# Patient Record
Sex: FEMALE | Race: WHITE | NOT HISPANIC OR LATINO | Employment: OTHER | URBAN - NONMETROPOLITAN AREA
[De-identification: names, ages, dates, MRNs, and addresses within clinical notes are randomized per-mention and may not be internally consistent; named-entity substitution may affect disease eponyms.]

---

## 2017-04-12 ENCOUNTER — HOSPITAL ENCOUNTER (EMERGENCY)
Facility: HOSPITAL | Age: 57
Discharge: HOME | End: 2017-04-12
Attending: EMERGENCY MEDICINE | Admitting: EMERGENCY MEDICINE

## 2017-04-12 VITALS
RESPIRATION RATE: 18 BRPM | TEMPERATURE: 97.9 F | BODY MASS INDEX: 30.53 KG/M2 | SYSTOLIC BLOOD PRESSURE: 128 MMHG | DIASTOLIC BLOOD PRESSURE: 76 MMHG | WEIGHT: 190 LBS | HEIGHT: 66 IN | OXYGEN SATURATION: 95 % | HEART RATE: 81 BPM

## 2017-04-12 DIAGNOSIS — B86 SCABIES: Primary | ICD-10-CM

## 2017-04-12 PROCEDURE — 99282 EMERGENCY DEPT VISIT SF MDM: CPT

## 2017-04-12 RX ORDER — PANTOPRAZOLE SODIUM 20 MG/1
20 TABLET, DELAYED RELEASE ORAL DAILY
COMMUNITY

## 2017-04-12 RX ORDER — ASPIRIN 81 MG/1
81 TABLET, CHEWABLE ORAL DAILY
COMMUNITY

## 2017-04-12 RX ORDER — LOSARTAN POTASSIUM 25 MG/1
50 TABLET ORAL 2 TIMES DAILY
COMMUNITY

## 2017-04-12 RX ORDER — PERMETHRIN 50 MG/G
CREAM TOPICAL TAKE AS DIRECTED
Qty: 60 G | Refills: 0 | Status: SHIPPED | OUTPATIENT
Start: 2017-04-12 | End: 2018-04-11 | Stop reason: HOSPADM

## 2017-04-12 RX ORDER — HYDROXYZINE HYDROCHLORIDE 25 MG/1
25 TABLET, FILM COATED ORAL EVERY 8 HOURS PRN
Qty: 14 TABLET | Refills: 0 | Status: SHIPPED | OUTPATIENT
Start: 2017-04-12 | End: 2018-09-04

## 2017-04-12 NOTE — ED PROVIDER NOTES
Subjective   History of Present Illness  TRIAGE CHIEF COMPLAINT:   Chief Complaint   Patient presents with   • Rash         HPI: Katelynn Ribeiro   is a 57 y.o. female   who presents to the emergency department complaining of itchy rash.  Patient describes rash predominantly in focal spots that look like bug bites on her bilateral upper extremities and to a lesser extent her lower extremities.  It is quite itchy and there are several areas which appear like lines of these little bumps on her forearms.  First noticed these symptoms a few days ago and it has been getting worse.  No other complaints.           Review of Systems   All other systems reviewed and are negative.      Past Medical History:   Diagnosis Date   • Esophagitis    • History of diverticulosis    • History of tattoo    • Hypertension        Allergies   Allergen Reactions   • Sulfa Antibiotics        Past Surgical History:   Procedure Laterality Date   • COLONOSCOPY     • UPPER GASTROINTESTINAL ENDOSCOPY         Family History   Problem Relation Age of Onset   • Cirrhosis Other        Social History     Social History   • Marital status: Single     Spouse name: N/A   • Number of children: N/A   • Years of education: N/A     Social History Main Topics   • Smoking status: Light Tobacco Smoker   • Smokeless tobacco: None   • Alcohol use Yes      Comment: stopped in Nov 2016   • Drug use: No   • Sexual activity: Not Asked     Other Topics Concern   • None     Social History Narrative   • None           Objective   Physical Exam    CONSTITUTIONAL: Awake, oriented, appears non-toxic   HENT: Atraumatic, normocephalic, oral mucosa pink and moist, airway patent. Nares patent without drainage. External ears normal.   EYES: Conjunctiva clear, EOMI, PERRL   NECK: Trachea midline, non-tender, supple   CARDIOVASCULAR: Normal heart rate, Normal rhythm, No murmurs, rubs, gallops   PULMONARY/CHEST: Clear to auscultation, no rhonchi, wheezes, or rales.  Symmetrical breath sounds. Non-tender.   ABDOMINAL: Non-distended, soft, non-tender - no rebound or guarding. BS normal.   NEUROLOGIC: Non-focal, moving all four extremities, no gross sensory or motor deficits.   EXTREMITIES: No clubbing, cyanosis, or edema   SKIN: Pruritic rash consistent with scabies infection.  No cellulitis.    No orders to display           EKG:         Procedures         ED Course  ED Course          ED COURSE / MEDICAL DECISION MAKING:       DECISION TO DISCHARGE/ADMIT: see ED care timeline       Electronically signed by: Francis Santiago MD, 4/12/2017 7:57 AM              [facility]    Final diagnoses:   Scabies            Francis Santiago MD  04/12/17 0759

## 2017-12-04 ENCOUNTER — TRANSCRIBE ORDERS (OUTPATIENT)
Dept: ADMINISTRATIVE | Facility: HOSPITAL | Age: 57
End: 2017-12-04

## 2017-12-04 DIAGNOSIS — Z12.39 ENCOUNTER FOR BREAST CANCER SCREENING OTHER THAN MAMMOGRAM: Primary | ICD-10-CM

## 2017-12-04 DIAGNOSIS — Z87.891 PERSONAL HISTORY OF TOBACCO USE, PRESENTING HAZARDS TO HEALTH: ICD-10-CM

## 2018-03-22 ENCOUNTER — OFFICE VISIT (OUTPATIENT)
Dept: SURGERY | Facility: CLINIC | Age: 58
End: 2018-03-22

## 2018-03-22 VITALS
BODY MASS INDEX: 27.48 KG/M2 | WEIGHT: 171 LBS | SYSTOLIC BLOOD PRESSURE: 122 MMHG | HEART RATE: 111 BPM | DIASTOLIC BLOOD PRESSURE: 70 MMHG | TEMPERATURE: 99.2 F | HEIGHT: 66 IN | OXYGEN SATURATION: 96 %

## 2018-03-22 DIAGNOSIS — Z12.11 ENCOUNTER FOR COLONOSCOPY DUE TO HISTORY OF ADENOMATOUS COLONIC POLYPS: Primary | ICD-10-CM

## 2018-03-22 DIAGNOSIS — Z86.010 ENCOUNTER FOR COLONOSCOPY DUE TO HISTORY OF ADENOMATOUS COLONIC POLYPS: Primary | ICD-10-CM

## 2018-03-22 DIAGNOSIS — R19.5 HEME POSITIVE STOOL: ICD-10-CM

## 2018-03-22 PROBLEM — Z86.0101 ENCOUNTER FOR COLONOSCOPY DUE TO HISTORY OF ADENOMATOUS COLONIC POLYPS: Status: ACTIVE | Noted: 2018-03-22

## 2018-03-22 PROCEDURE — 99203 OFFICE O/P NEW LOW 30 MIN: CPT | Performed by: SURGERY

## 2018-03-22 RX ORDER — METOPROLOL SUCCINATE 50 MG/1
50 TABLET, EXTENDED RELEASE ORAL DAILY
Refills: 11 | COMMUNITY
Start: 2018-03-14

## 2018-03-22 RX ORDER — TRAZODONE HYDROCHLORIDE 100 MG/1
150 TABLET ORAL NIGHTLY
COMMUNITY
Start: 2014-04-18

## 2018-03-22 RX ORDER — FLUTICASONE PROPIONATE 50 MCG
2 SPRAY, SUSPENSION (ML) NASAL DAILY PRN
COMMUNITY
Start: 2014-11-04

## 2018-03-22 NOTE — PROGRESS NOTES
Patient: Katelynn Ribeiro    YOB: 1960    Date: 03/22/2018    Primary Care Provider: THEODORE Robles    Chief Complaint   Patient presents with   • Rectal Bleeding     Patient is here for a postive occult blood test       Subjective .     History of present illness:  I saw the patient in the office today as a consultation for evaluation and treatment of blood in stool.  Patient states that she has not noticed any blood in her stool.  Patient states that she will go three days before she will have a bowel movement.  Patient states that is her normal bowel routine.  Patient states that she will get gas pains every once in a while.  Patient had a colonoscopy done 4  years ago. Patient denies any family history of colon cancer.    The following portions of the patient's history were reviewed and updated as appropriate: allergies, current medications, past family history, past medical history, past social history, past surgical history and problem list.      Review of Systems   Constitutional: Negative for chills, fever and unexpected weight change.   HENT: Negative for hearing loss, trouble swallowing and voice change.    Eyes: Negative for visual disturbance.   Respiratory: Positive for shortness of breath. Negative for apnea, cough, chest tightness and wheezing.    Cardiovascular: Negative for chest pain, palpitations and leg swelling.   Gastrointestinal: Positive for blood in stool. Negative for abdominal distention, abdominal pain, anal bleeding, constipation, diarrhea, nausea, rectal pain and vomiting.   Endocrine: Negative for cold intolerance and heat intolerance.   Genitourinary: Negative for difficulty urinating, dysuria and flank pain.   Musculoskeletal: Negative for back pain and gait problem.   Skin: Negative for color change, rash and wound.   Neurological: Negative for dizziness, syncope, speech difficulty, weakness, light-headedness, numbness and headaches.   Hematological:  Negative for adenopathy. Does not bruise/bleed easily.   Psychiatric/Behavioral: Negative for confusion. The patient is not nervous/anxious.        History:  Past Medical History:   Diagnosis Date   • Esophagitis    • History of diverticulosis    • History of tattoo    • Hypertension        Past Surgical History:   Procedure Laterality Date   • COLONOSCOPY     • UPPER GASTROINTESTINAL ENDOSCOPY         Family History   Problem Relation Age of Onset   • Cirrhosis Other        Social History   Substance Use Topics   • Smoking status: Light Tobacco Smoker   • Smokeless tobacco: Not on file   • Alcohol use Yes      Comment: stopped in Nov 2016       Allergies:  Allergies   Allergen Reactions   • Sulfa Antibiotics        Medications:    Current Outpatient Prescriptions:   •  aspirin 81 MG chewable tablet, Chew 81 mg Daily., Disp: , Rfl:   •  fluticasone (FLONASE) 50 MCG/ACT nasal spray, into each nostril., Disp: , Rfl:   •  hydrOXYzine (ATARAX) 25 MG tablet, Take 1 tablet by mouth Every 8 (Eight) Hours As Needed for Itching., Disp: 14 tablet, Rfl: 0  •  INCRUSE ELLIPTA 62.5 MCG/INH aerosol powder , INHALE 1 PUFF BY INHALATION ROUTE EVERY DAY AT THE SAME TIME EACH DAY, Disp: , Rfl: 2  •  losartan (COZAAR) 25 MG tablet, Take 25 mg by mouth 2 (Two) Times a Day., Disp: , Rfl:   •  metoprolol succinate XL (TOPROL-XL) 50 MG 24 hr tablet, Take 50 mg by mouth Daily., Disp: , Rfl: 11  •  Multiple Vitamin (MULTI-VITAMIN DAILY PO), Take  by mouth., Disp: , Rfl:   •  pantoprazole (PROTONIX) 20 MG EC tablet, Take 20 mg by mouth Daily., Disp: , Rfl:   •  permethrin (ACTICIN) 5 % cream, Apply  topically Take As Directed for 2 doses. Apply cream to entire body, wash off after 8-14 hours then repeat application in one week, Disp: 60 g, Rfl: 0  •  sertraline (ZOLOFT) 50 MG tablet, Take 50 mg by mouth Daily., Disp: , Rfl:   •  traZODone (DESYREL) 100 MG tablet, Take  by mouth., Disp: , Rfl:     Objective     Vital Signs:   Temp:  [99.2 °F  (37.3 °C)] 99.2 °F (37.3 °C)  Heart Rate:  [111] 111  BP: (122)/(70) 122/70    Physical Exam:   General Appearance:    Alert, cooperative, in no acute distress   Head:    Normocephalic, without obvious abnormality, atraumatic   Eyes:            Lids and lashes normal, conjunctivae and sclerae normal, no   icterus, no pallor, corneas clear, PERRL   Ears:    Ears appear intact with no abnormalities noted   Lungs:     Clear to auscultation,respirations regular, even and                  unlabored    Heart:    Regular rhythm and normal rate, normal S1 and S2, no            murmur   Abdomen:     no masses, no organomegaly, soft non-tender, non-distended, no guarding, there is no evidence of tenderness   Extremities:   Moves all extremities well, no edema, no cyanosis, no             redness   Skin:   No bleeding, bruising or rash   Neurologic:   Cranial nerves 2 - 12 grossly intact, sensation intact      Results Review:   I reviewed the patient's new clinical results.  Labs were reviewed    Review of Systems was reviewed and confirmed as accurate today.    Assessment/Plan :    1. Encounter for colonoscopy due to history of adenomatous colonic polyps    2. Heme positive stool        I recommend a colonoscopy for further evaluation. The procedure was explained as well as the risks which include but are not limited to bleeding, infection, perforation, abdominal pain etc. The patient understands these risks and the procedure and wishes to proceed.     Electronically signed by Tan Nolan MD  03/22/18 2:23 PM  Scribed for Tan Nolan MD by Yanni Gorman. 3/22/2018  3:00 PM

## 2018-03-23 PROBLEM — R19.5 HEME POSITIVE STOOL: Status: ACTIVE | Noted: 2018-03-23

## 2018-04-10 RX ORDER — POLYETHYLENE GLYCOL 3350 17 G/17G
238 POWDER, FOR SOLUTION ORAL ONCE
Qty: 14 PACKET | Refills: 0 | Status: SHIPPED | OUTPATIENT
Start: 2018-04-10 | End: 2018-04-11 | Stop reason: HOSPADM

## 2018-04-10 RX ORDER — BISACODYL 5 MG/1
5 TABLET, DELAYED RELEASE ORAL DAILY PRN
Qty: 4 TABLET | Refills: 0 | Status: SHIPPED | OUTPATIENT
Start: 2018-04-10

## 2018-04-10 RX ORDER — POLYETHYLENE GLYCOL 3350 17 G/17G
238 POWDER, FOR SOLUTION ORAL ONCE
Qty: 14 PACKET | Refills: 0 | Status: SHIPPED | OUTPATIENT
Start: 2018-04-10 | End: 2018-04-10

## 2018-04-10 RX ORDER — BISACODYL 5 MG/1
5 TABLET, DELAYED RELEASE ORAL DAILY PRN
Qty: 4 TABLET | Refills: 0 | Status: SHIPPED | OUTPATIENT
Start: 2018-04-10 | End: 2018-04-10

## 2018-04-11 ENCOUNTER — HOSPITAL ENCOUNTER (OUTPATIENT)
Facility: HOSPITAL | Age: 58
Setting detail: HOSPITAL OUTPATIENT SURGERY
Discharge: HOME | End: 2018-04-11
Attending: SURGERY | Admitting: SURGERY

## 2018-04-11 ENCOUNTER — ANESTHESIA (OUTPATIENT)
Dept: GASTROENTEROLOGY | Facility: HOSPITAL | Age: 58
End: 2018-04-11

## 2018-04-11 ENCOUNTER — ANESTHESIA EVENT (OUTPATIENT)
Dept: GASTROENTEROLOGY | Facility: HOSPITAL | Age: 58
End: 2018-04-11

## 2018-04-11 VITALS
TEMPERATURE: 98.1 F | DIASTOLIC BLOOD PRESSURE: 71 MMHG | HEART RATE: 72 BPM | WEIGHT: 171 LBS | OXYGEN SATURATION: 96 % | HEIGHT: 66 IN | RESPIRATION RATE: 18 BRPM | SYSTOLIC BLOOD PRESSURE: 146 MMHG | BODY MASS INDEX: 27.48 KG/M2

## 2018-04-11 DIAGNOSIS — R19.5 HEME POSITIVE STOOL: ICD-10-CM

## 2018-04-11 DIAGNOSIS — Z86.010 ENCOUNTER FOR COLONOSCOPY DUE TO HISTORY OF ADENOMATOUS COLONIC POLYPS: ICD-10-CM

## 2018-04-11 DIAGNOSIS — Z12.11 ENCOUNTER FOR COLONOSCOPY DUE TO HISTORY OF ADENOMATOUS COLONIC POLYPS: ICD-10-CM

## 2018-04-11 PROCEDURE — 25010000002 PROPOFOL 10 MG/ML EMULSION: Performed by: NURSE ANESTHETIST, CERTIFIED REGISTERED

## 2018-04-11 RX ORDER — ONDANSETRON 2 MG/ML
4 INJECTION INTRAMUSCULAR; INTRAVENOUS ONCE AS NEEDED
Status: DISCONTINUED | OUTPATIENT
Start: 2018-04-11 | End: 2018-04-11 | Stop reason: HOSPADM

## 2018-04-11 RX ORDER — PROPOFOL 10 MG/ML
VIAL (ML) INTRAVENOUS AS NEEDED
Status: DISCONTINUED | OUTPATIENT
Start: 2018-04-11 | End: 2018-04-11 | Stop reason: SURG

## 2018-04-11 RX ORDER — LIDOCAINE HYDROCHLORIDE 20 MG/ML
INJECTION, SOLUTION INFILTRATION; PERINEURAL AS NEEDED
Status: DISCONTINUED | OUTPATIENT
Start: 2018-04-11 | End: 2018-04-11 | Stop reason: SURG

## 2018-04-11 RX ORDER — ONDANSETRON 2 MG/ML
4 INJECTION INTRAMUSCULAR; INTRAVENOUS ONCE AS NEEDED
Status: CANCELLED | OUTPATIENT
Start: 2018-04-11 | End: 2018-04-11

## 2018-04-11 RX ORDER — SODIUM CHLORIDE, SODIUM LACTATE, POTASSIUM CHLORIDE, CALCIUM CHLORIDE 600; 310; 30; 20 MG/100ML; MG/100ML; MG/100ML; MG/100ML
INJECTION, SOLUTION INTRAVENOUS CONTINUOUS PRN
Status: DISCONTINUED | OUTPATIENT
Start: 2018-04-11 | End: 2018-04-11 | Stop reason: SURG

## 2018-04-11 RX ADMIN — LIDOCAINE HYDROCHLORIDE 100 MG: 20 INJECTION, SOLUTION INFILTRATION; PERINEURAL at 08:15

## 2018-04-11 RX ADMIN — PROPOFOL 600 MG: 10 INJECTION, EMULSION INTRAVENOUS at 08:40

## 2018-04-11 RX ADMIN — SODIUM CHLORIDE, POTASSIUM CHLORIDE, SODIUM LACTATE AND CALCIUM CHLORIDE: 600; 310; 30; 20 INJECTION, SOLUTION INTRAVENOUS at 08:08

## 2018-04-11 NOTE — ANESTHESIA PREPROCEDURE EVALUATION
Anesthesia Evaluation     Patient summary reviewed and Nursing notes reviewed   no history of anesthetic complications:  NPO Solid Status: > 8 hours  NPO Liquid Status: > 8 hours           Airway   Mallampati: II  TM distance: >3 FB  Neck ROM: full  no difficulty expected  Dental - normal exam     Pulmonary - normal exam   (+) COPD moderate,   Cardiovascular - normal exam    Rhythm: regular  Rate: normal    (+) hypertension well controlled,       Neuro/Psych  (+) psychiatric history Anxiety and Depression,     GI/Hepatic/Renal/Endo    (+)  GERD well controlled,      Musculoskeletal     Abdominal    Substance History - negative use     OB/GYN negative ob/gyn ROS         Other   (+) arthritis     ROS/Med Hx Other: Wears O2 at hs  Uses MDIs daily                Anesthesia Plan    ASA 2     MAC   (Pt told that intravenous sedation will be used as the primary anesthetic along with local anesthesia if necessary. Every effort will be made to make sure the patient is comfortable.     The patient was told they may or may not have recall for the procedure. It was further explained that if the MAC was not adequate that a general anesthetic with either an LMA or endotracheal tube would be required.     Will proceed with the plan of care.)  intravenous induction   Anesthetic plan and risks discussed with patient.

## 2018-04-11 NOTE — ANESTHESIA POSTPROCEDURE EVALUATION
Patient: Katelynn Ribeiro    Procedure Summary     Date:  04/11/18 Room / Location:  [facility] ENDOSCOPY 3 / [facility] ENDOSCOPY    Anesthesia Start:  0808 Anesthesia Stop:  0843    Procedure:  COLONOSCOPY (N/A Anus) Diagnosis:       Heme positive stool      Encounter for colonoscopy due to history of adenomatous colonic polyps      (Heme positive stool [R19.5])      (Encounter for colonoscopy due to history of adenomatous colonic polyps [Z12.11, Z86.010])    Surgeon:  Tan Nolan MD Provider:  Chato Ascencio CRNA    Anesthesia Type:  MAC ASA Status:  2          Anesthesia Type: MAC  Last vitals  BP   121/63 (04/11/18 0845)   Temp   98.1 °F (36.7 °C) (04/11/18 0845)   Pulse   75 (04/11/18 0845)   Resp   18 (04/11/18 0845)     SpO2   100 % (04/11/18 0845)     Post Anesthesia Care and Evaluation    Patient location during evaluation: PHASE II  Patient participation: complete - patient participated  Level of consciousness: awake  Pain score: 1  Pain management: adequate  Airway patency: patent  Anesthetic complications: No anesthetic complications  PONV Status: controlled  Cardiovascular status: acceptable and stable  Respiratory status: acceptable and nasal cannula  Hydration status: acceptable

## 2018-04-17 LAB
LAB AP CASE REPORT: NORMAL
Lab: NORMAL
PATH REPORT.FINAL DX SPEC: NORMAL

## 2018-04-18 ENCOUNTER — TRANSCRIBE ORDERS (OUTPATIENT)
Dept: ADMINISTRATIVE | Facility: HOSPITAL | Age: 58
End: 2018-04-18

## 2018-04-18 DIAGNOSIS — Z12.39 SCREENING BREAST EXAMINATION: Primary | ICD-10-CM

## 2018-04-25 ENCOUNTER — APPOINTMENT (OUTPATIENT)
Dept: GENERAL RADIOLOGY | Facility: HOSPITAL | Age: 58
End: 2018-04-25

## 2018-04-25 ENCOUNTER — HOSPITAL ENCOUNTER (EMERGENCY)
Facility: HOSPITAL | Age: 58
Discharge: HOME | End: 2018-04-25
Attending: STUDENT IN AN ORGANIZED HEALTH CARE EDUCATION/TRAINING PROGRAM | Admitting: STUDENT IN AN ORGANIZED HEALTH CARE EDUCATION/TRAINING PROGRAM

## 2018-04-25 VITALS
DIASTOLIC BLOOD PRESSURE: 68 MMHG | RESPIRATION RATE: 18 BRPM | WEIGHT: 175.4 LBS | SYSTOLIC BLOOD PRESSURE: 145 MMHG | HEART RATE: 68 BPM | TEMPERATURE: 98.5 F | BODY MASS INDEX: 28.19 KG/M2 | HEIGHT: 66 IN | OXYGEN SATURATION: 98 %

## 2018-04-25 DIAGNOSIS — S22.22XA CLOSED FRACTURE OF BODY OF STERNUM, INITIAL ENCOUNTER: Primary | ICD-10-CM

## 2018-04-25 PROCEDURE — 93005 ELECTROCARDIOGRAM TRACING: CPT | Performed by: STUDENT IN AN ORGANIZED HEALTH CARE EDUCATION/TRAINING PROGRAM

## 2018-04-25 PROCEDURE — 25010000002 MORPHINE PER 10 MG: Performed by: STUDENT IN AN ORGANIZED HEALTH CARE EDUCATION/TRAINING PROGRAM

## 2018-04-25 PROCEDURE — 99284 EMERGENCY DEPT VISIT MOD MDM: CPT

## 2018-04-25 PROCEDURE — 71046 X-RAY EXAM CHEST 2 VIEWS: CPT

## 2018-04-25 PROCEDURE — 96374 THER/PROPH/DIAG INJ IV PUSH: CPT

## 2018-04-25 PROCEDURE — 25010000002 ONDANSETRON PER 1 MG: Performed by: STUDENT IN AN ORGANIZED HEALTH CARE EDUCATION/TRAINING PROGRAM

## 2018-04-25 PROCEDURE — 96376 TX/PRO/DX INJ SAME DRUG ADON: CPT

## 2018-04-25 PROCEDURE — 96375 TX/PRO/DX INJ NEW DRUG ADDON: CPT

## 2018-04-25 RX ORDER — HYDROCODONE BITARTRATE AND ACETAMINOPHEN 5; 325 MG/1; MG/1
1 TABLET ORAL EVERY 6 HOURS PRN
Qty: 20 TABLET | Refills: 0 | Status: SHIPPED | OUTPATIENT
Start: 2018-04-25 | End: 2018-09-04

## 2018-04-25 RX ORDER — ONDANSETRON 2 MG/ML
4 INJECTION INTRAMUSCULAR; INTRAVENOUS ONCE
Status: COMPLETED | OUTPATIENT
Start: 2018-04-25 | End: 2018-04-25

## 2018-04-25 RX ORDER — MORPHINE SULFATE 4 MG/ML
4 INJECTION, SOLUTION INTRAMUSCULAR; INTRAVENOUS ONCE
Status: COMPLETED | OUTPATIENT
Start: 2018-04-25 | End: 2018-04-25

## 2018-04-25 RX ADMIN — MORPHINE SULFATE 4 MG: 4 INJECTION INTRAVENOUS at 23:08

## 2018-04-25 RX ADMIN — ONDANSETRON 4 MG: 2 INJECTION INTRAMUSCULAR; INTRAVENOUS at 20:53

## 2018-04-25 RX ADMIN — MORPHINE SULFATE 4 MG: 4 INJECTION INTRAVENOUS at 20:54
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
## 2018-05-18 ENCOUNTER — HOSPITAL ENCOUNTER (EMERGENCY)
Facility: HOSPITAL | Age: 58
Discharge: HOME | End: 2018-05-19
Attending: EMERGENCY MEDICINE | Admitting: EMERGENCY MEDICINE

## 2018-05-18 VITALS
TEMPERATURE: 98.4 F | RESPIRATION RATE: 16 BRPM | BODY MASS INDEX: 28.35 KG/M2 | HEIGHT: 66 IN | SYSTOLIC BLOOD PRESSURE: 152 MMHG | WEIGHT: 176.4 LBS | HEART RATE: 87 BPM | DIASTOLIC BLOOD PRESSURE: 82 MMHG | OXYGEN SATURATION: 95 %

## 2018-05-18 DIAGNOSIS — S29.9XXD: Primary | ICD-10-CM

## 2018-05-18 PROCEDURE — 99283 EMERGENCY DEPT VISIT LOW MDM: CPT

## 2018-05-18 PROCEDURE — 96372 THER/PROPH/DIAG INJ SC/IM: CPT

## 2018-05-18 PROCEDURE — 25010000002 KETOROLAC TROMETHAMINE PER 15 MG: Performed by: EMERGENCY MEDICINE

## 2018-05-18 RX ORDER — KETOROLAC TROMETHAMINE 30 MG/ML
60 INJECTION, SOLUTION INTRAMUSCULAR; INTRAVENOUS ONCE
Status: COMPLETED | OUTPATIENT
Start: 2018-05-18 | End: 2018-05-18

## 2018-05-18 RX ORDER — OXYCODONE HYDROCHLORIDE AND ACETAMINOPHEN 5; 325 MG/1; MG/1
1 TABLET ORAL ONCE
Status: COMPLETED | OUTPATIENT
Start: 2018-05-18 | End: 2018-05-18

## 2018-05-18 RX ADMIN — OXYCODONE AND ACETAMINOPHEN 1 TABLET: 5; 325 TABLET ORAL at 23:48

## 2018-05-18 RX ADMIN — KETOROLAC TROMETHAMINE 60 MG: 30 INJECTION, SOLUTION INTRAMUSCULAR at 23:48
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
## 2018-08-06 ENCOUNTER — HOSPITAL ENCOUNTER (EMERGENCY)
Facility: HOSPITAL | Age: 58
Discharge: HOME | End: 2018-08-06
Attending: EMERGENCY MEDICINE | Admitting: EMERGENCY MEDICINE

## 2018-08-06 ENCOUNTER — APPOINTMENT (OUTPATIENT)
Dept: ULTRASOUND IMAGING | Facility: HOSPITAL | Age: 58
End: 2018-08-06

## 2018-08-06 VITALS
HEART RATE: 66 BPM | SYSTOLIC BLOOD PRESSURE: 173 MMHG | HEIGHT: 66 IN | WEIGHT: 175.2 LBS | DIASTOLIC BLOOD PRESSURE: 87 MMHG | RESPIRATION RATE: 18 BRPM | TEMPERATURE: 98.6 F | OXYGEN SATURATION: 98 % | BODY MASS INDEX: 28.16 KG/M2

## 2018-08-06 DIAGNOSIS — M79.604 PAIN OF RIGHT LOWER EXTREMITY: ICD-10-CM

## 2018-08-06 DIAGNOSIS — R21 RASH: Primary | ICD-10-CM

## 2018-08-06 PROCEDURE — 25010000002 KETOROLAC TROMETHAMINE PER 15 MG: Performed by: EMERGENCY MEDICINE

## 2018-08-06 PROCEDURE — 99283 EMERGENCY DEPT VISIT LOW MDM: CPT

## 2018-08-06 PROCEDURE — 96372 THER/PROPH/DIAG INJ SC/IM: CPT

## 2018-08-06 PROCEDURE — 93970 EXTREMITY STUDY: CPT

## 2018-08-06 RX ORDER — DIAPER,BRIEF,INFANT-TODD,DISP
EACH MISCELLANEOUS EVERY 6 HOURS PRN
Qty: 28 G | Refills: 0 | Status: SHIPPED | OUTPATIENT
Start: 2018-08-06 | End: 2018-12-12

## 2018-08-06 RX ORDER — KETOROLAC TROMETHAMINE 30 MG/ML
30 INJECTION, SOLUTION INTRAMUSCULAR; INTRAVENOUS ONCE
Status: COMPLETED | OUTPATIENT
Start: 2018-08-06 | End: 2018-08-06

## 2018-08-06 RX ADMIN — KETOROLAC TROMETHAMINE 30 MG: 30 INJECTION, SOLUTION INTRAMUSCULAR at 16:27

## 2018-08-06 NOTE — ED PROVIDER NOTES
TRIAGE CHIEF COMPLAINT:     Nursing and triage notes reviewed    Chief Complaint   Patient presents with   • Leg Pain      HPI: Katelynn Ribeiro is a 58 y.o. female who presents to the emergency department complaining of Leg discomfort.  Patient states she has had several weeks worth of slight discoloration in the back of her knees.  Shouldn't describes a slight darkening of the skin in this area.  Patient states she is seen her primary care physician about this and is referred to a cardiologist, Dr. Jimenez.  Patient states that for the past few days she's been having sharp stabbing pain behind the right knee and was concerned about a blood clot.  She denies any numbness or tingling.  Denies any history of blood clots.  Denies any fevers or chills.    REVIEW OF SYSTEMS: All other systems reviewed and are negative     PAST MEDICAL HISTORY:   Past Medical History:   Diagnosis Date   • Anxiety    • Arthritis    • COPD (chronic obstructive pulmonary disease) (CMS/HCC)    • Esophagitis    • History of tattoo    • Hypertension         FAMILY HISTORY:   Family History   Problem Relation Age of Onset   • Cirrhosis Other         SOCIAL HISTORY:   Social History     Social History   • Marital status: Single     Spouse name: N/A   • Number of children: N/A   • Years of education: N/A     Occupational History   • Not on file.     Social History Main Topics   • Smoking status: Former Smoker   • Smokeless tobacco: Never Used      Comment: QUIT DEC 2017   • Alcohol use Yes      Comment: stopped in Nov 2016/OCCASIONAL   • Drug use: No   • Sexual activity: Defer     Other Topics Concern   • Not on file     Social History Narrative   • No narrative on file        SURGICAL HISTORY:   Past Surgical History:   Procedure Laterality Date   • COLONOSCOPY     • COLONOSCOPY N/A 4/11/2018    Procedure: COLONOSCOPY WITH SNARE POLYPECTOMY;  Surgeon: Tan Nolan MD;  Location: [facility] ENDOSCOPY;  Service: Gastroenterology   • UPPER  GASTROINTESTINAL ENDOSCOPY          CURRENT MEDICATIONS:      Medication List      ASK your doctor about these medications    aspirin 81 MG chewable tablet     bisacodyl 5 MG EC tablet  Commonly known as:  bisacodyl  Take 1 tablet by mouth Daily As Needed for Constipation.     BREO ELLIPTA 100-25 MCG/INH aerosol powder   Generic drug:  Fluticasone Furoate-Vilanterol     fluticasone 50 MCG/ACT nasal spray  Commonly known as:  FLONASE     HYDROcodone-acetaminophen 5-325 MG per tablet  Commonly known as:  NORCO  Take 1 tablet by mouth Every 6 (Six) Hours As Needed for Severe Pain .     hydrOXYzine 25 MG tablet  Commonly known as:  ATARAX  Take 1 tablet by mouth Every 8 (Eight) Hours As Needed for Itching.     INCRUSE ELLIPTA 62.5 MCG/INH aerosol powder   Generic drug:  Umeclidinium Bromide     losartan 25 MG tablet  Commonly known as:  COZAAR     metoprolol succinate XL 50 MG 24 hr tablet  Commonly known as:  TOPROL-XL     MULTI-VITAMIN DAILY PO     pantoprazole 20 MG EC tablet  Commonly known as:  PROTONIX     sertraline 50 MG tablet  Commonly known as:  ZOLOFT     traZODone 100 MG tablet  Commonly known as:  DESYREL           ALLERGIES: Sulfa antibiotics     PHYSICAL EXAM:   VITAL SIGNS:   Vitals:    08/06/18 1515   BP: 178/82   Pulse: 72   Resp: 18   Temp: 98.6 °F (37 °C)   SpO2: 97%      CONSTITUTIONAL: Awake, oriented, appears non-toxic   HENT: Atraumatic, normocephalic, oral mucosa pink and moist, airway patent.  EYES: Conjunctiva clear   NECK: Trachea midline  CARDIOVASCULAR: Normal heart rate, Normal rhythm, No murmurs, rubs, gallops   PULMONARY/CHEST: Clear to auscultation, no rhonchi, wheezes, or rales. Symmetrical breath sounds.  ABDOMINAL: Non-distended, soft, non-tender - no rebound or guarding.  NEUROLOGIC: Non-focal, moving all four extremities, no gross sensory or motor deficits.   EXTREMITIES: No clubbing, cyanosis, or edema.  There is slight darkening of the skin behind the bilateral knees.  The left  lower extremity is nontender to palpation, full range of motion without discomfort.  There is tenderness behind the right knee.  There is no obvious swelling, erythema, or fluctuance.  No pain in the calves bilaterally.  Distal dorsalis pedis and posterior tibial pulses are intact.   SKIN: Warm, Dry, No erythema, No rash     ED COURSE / MEDICAL DECISION MAKING:   Katelynn Ribeiro is a 58 y.o. female who presents to the emergency department for evaluation of leg pain.  Patient is nondistressed on arrival in the emergency department.  Physical exam does reveal slight discoloration behind the legs but distal pulses are intact.  There is tenderness behind the right knee.  We will obtain ultrasounds for further evaluation.  Ultrasound is negative.  Rash does not look infectious.  It is not itchy so I doubt yeast infection.  No sign of abscess.  Will refer to dermatology for further evaluation.    DECISION TO DISCHARGE/ADMIT: see ED care timeline     FINAL IMPRESSION:   1 -- rash   2 -- leg pain  3 --     Electronically signed by: Adia Werner MD, 8/6/2018 3:43 PM       Adia Werner MD  08/06/18 [id number]

## 2018-08-16 ENCOUNTER — APPOINTMENT (OUTPATIENT)
Dept: GENERAL RADIOLOGY | Facility: HOSPITAL | Age: 58
End: 2018-08-16

## 2018-08-16 ENCOUNTER — HOSPITAL ENCOUNTER (EMERGENCY)
Facility: HOSPITAL | Age: 58
Discharge: HOME | End: 2018-08-16
Attending: STUDENT IN AN ORGANIZED HEALTH CARE EDUCATION/TRAINING PROGRAM | Admitting: STUDENT IN AN ORGANIZED HEALTH CARE EDUCATION/TRAINING PROGRAM

## 2018-08-16 VITALS
TEMPERATURE: 98.6 F | RESPIRATION RATE: 18 BRPM | HEART RATE: 80 BPM | DIASTOLIC BLOOD PRESSURE: 77 MMHG | SYSTOLIC BLOOD PRESSURE: 142 MMHG | HEIGHT: 66 IN | WEIGHT: 173.8 LBS | BODY MASS INDEX: 27.93 KG/M2 | OXYGEN SATURATION: 99 %

## 2018-08-16 DIAGNOSIS — S52.501A CLOSED FRACTURE OF DISTAL END OF RIGHT RADIUS, UNSPECIFIED FRACTURE MORPHOLOGY, INITIAL ENCOUNTER: Primary | ICD-10-CM

## 2018-08-16 PROCEDURE — 73110 X-RAY EXAM OF WRIST: CPT

## 2018-08-16 PROCEDURE — 73130 X-RAY EXAM OF HAND: CPT

## 2018-08-16 PROCEDURE — 99283 EMERGENCY DEPT VISIT LOW MDM: CPT

## 2018-08-16 RX ORDER — HYDROCODONE BITARTRATE AND ACETAMINOPHEN 7.5; 325 MG/1; MG/1
1 TABLET ORAL EVERY 8 HOURS PRN
Qty: 10 TABLET | Refills: 0 | Status: SHIPPED | OUTPATIENT
Start: 2018-08-16 | End: 2018-09-04

## 2018-08-16 RX ORDER — HYDROCODONE BITARTRATE AND ACETAMINOPHEN 7.5; 325 MG/1; MG/1
1 TABLET ORAL ONCE
Status: COMPLETED | OUTPATIENT
Start: 2018-08-16 | End: 2018-08-16

## 2018-08-16 RX ADMIN — HYDROCODONE BITARTRATE AND ACETAMINOPHEN 1 TABLET: 7.5; 325 TABLET ORAL at 19:44

## 2018-08-16 NOTE — ED PROVIDER NOTES
"Subjective   59 y/o female that comes in with c/c \"Fall\" X one day ago.  Patient states that she was walking on concrete tripped falling on her outstretched right hand and wrist. Patient does state that she has had excruciating pain since the injury. Patient has had increased swelling/edema to right wrist. Patient denies any head injury or any other injury at this time.         History provided by:  Patient   used: No    Fall   Mechanism of injury: fall    Injury location:  Hand  Hand injury location:  R wrist and R hand  Incident location:  Home  Arrived directly from scene: no    Fall:     Fall occurred:  Standing    Impact surface:  Athletic surface  Suspicion of alcohol use: no    Suspicion of drug use: no    Tetanus status:  Unknown  Prior to arrival data:     Bystander interventions:  None    Patient ambulatory at scene: no      Blood loss:  None    Orientation at scene:  Person, place, situation and time    Loss of consciousness: no      Amnesic to event: no      Airway interventions:  None    IV access status:  None    IO access:  None    Fluids administered:  None    Medications administered:  None  Associated symptoms: no abdominal pain, no back pain, no difficulty breathing, no headaches, no nausea, no neck pain and no seizures    Risk factors: past MI    Risk factors: no anticoagulation therapy, no beta blocker therapy, no COPD, no diabetes, no pacemaker and not pregnant        Review of Systems   Gastrointestinal: Negative for abdominal pain and nausea.   Musculoskeletal: Positive for arthralgias, joint swelling and myalgias. Negative for back pain and neck pain.   Skin: Negative for color change, pallor and rash.   Neurological: Negative for seizures and headaches.   All other systems reviewed and are negative.      Past Medical History:   Diagnosis Date   • Anxiety    • Arthritis    • COPD (chronic obstructive pulmonary disease) (CMS/[facility])    • Esophagitis    • History of tattoo  "   • Hypertension        Allergies   Allergen Reactions   • Sulfa Antibiotics Hives     ITCHING ,FLUSHING, SEVERE LOWER BACK PAIN       Past Surgical History:   Procedure Laterality Date   • COLONOSCOPY     • COLONOSCOPY N/A 4/11/2018    Procedure: COLONOSCOPY WITH SNARE POLYPECTOMY;  Surgeon: Tan Nolan MD;  Location: [facility] ENDOSCOPY;  Service: Gastroenterology   • UPPER GASTROINTESTINAL ENDOSCOPY         Family History   Problem Relation Age of Onset   • Cirrhosis Other        Social History     Social History   • Marital status: Single     Social History Main Topics   • Smoking status: Former Smoker   • Smokeless tobacco: Never Used      Comment: QUIT DEC 2017   • Alcohol use Yes      Comment: stopped in Nov 2016/OCCASIONAL   • Drug use: No   • Sexual activity: Defer     Other Topics Concern   • Not on file           Objective   Physical Exam   Constitutional: She is oriented to person, place, and time. She appears well-developed and well-nourished. No distress.   HENT:   Head: Normocephalic.   Right Ear: External ear normal.   Left Ear: External ear normal.   Nose: Nose normal.   Mouth/Throat: Oropharynx is clear and moist. No oropharyngeal exudate.   Eyes: Pupils are equal, round, and reactive to light. Conjunctivae and EOM are normal. Right eye exhibits no discharge. Left eye exhibits no discharge. No scleral icterus.   Neck: Normal range of motion. Neck supple. No JVD present. No tracheal deviation present. No thyromegaly present.   Cardiovascular: Normal rate, regular rhythm, normal heart sounds and intact distal pulses.  Exam reveals no gallop and no friction rub.    No murmur heard.  Pulmonary/Chest: Effort normal and breath sounds normal. No stridor. No respiratory distress. She has no wheezes. She has no rales. She exhibits no tenderness.   Abdominal: Soft. Bowel sounds are normal. She exhibits no distension and no mass. There is no tenderness. There is no rebound and no guarding.    Musculoskeletal: She exhibits tenderness. She exhibits no edema.        Right wrist: She exhibits decreased range of motion, tenderness, swelling, effusion and deformity.   Contusion and swelling of right wrist. N/V intact. Limited range of motion secondary to pain.    Neurological: She is alert and oriented to person, place, and time. She displays normal reflexes. No cranial nerve deficit. She exhibits normal muscle tone. Coordination normal.   Skin: Skin is warm. Capillary refill takes less than 2 seconds. No rash noted. She is not diaphoretic. No erythema. No pallor.   Psychiatric: She has a normal mood and affect. Her behavior is normal. Judgment and thought content normal.   Nursing note and vitals reviewed.      Splint - Cast - Strapping  Date/Time: 8/16/2018 8:25 PM  Performed by: SONAM MCRAE  Authorized by: ALEJANDRO CORREIA     Consent:     Consent obtained:  Verbal    Consent given by:  Parent    Risks discussed:  Pain and swelling    Alternatives discussed:  No treatment  Pre-procedure details:     Sensation:  Normal    Skin color:  Pink   Procedure details:     Laterality:  Right    Location:  Wrist    Wrist:  R wrist    Strapping: no      Cast type:  Short arm    Splint type:  Wrist and volar short arm    Supplies:  Ortho-Glass and sling  Post-procedure details:     Pain:  Improved    Sensation:  Normal    Skin color:  Pink     Patient tolerance of procedure:  Tolerated well, no immediate complications  Comments:      N/V intact                ED Course  ED Course as of Aug 16 2028   Thu Aug 16, 2018   2024 Distal radius fracture. Discussed care with patient. Patient will be discharged. Advised to follow-up orthopedics in next 1-2 days.   [BH]      ED Course User Index  [BH] Sonam Mcrae PA-C                  [facility]      Final diagnoses:   Closed fracture of distal end of right radius, unspecified fracture morphology, initial encounter            Sonam Mcrae PA-C  08/16/18  2028

## 2018-08-19 ENCOUNTER — HOSPITAL ENCOUNTER (EMERGENCY)
Facility: HOSPITAL | Age: 58
Discharge: HOME | End: 2018-08-19
Attending: EMERGENCY MEDICINE | Admitting: EMERGENCY MEDICINE

## 2018-08-19 ENCOUNTER — APPOINTMENT (OUTPATIENT)
Dept: GENERAL RADIOLOGY | Facility: HOSPITAL | Age: 58
End: 2018-08-19

## 2018-08-19 VITALS
RESPIRATION RATE: 17 BRPM | OXYGEN SATURATION: 95 % | BODY MASS INDEX: 27.8 KG/M2 | DIASTOLIC BLOOD PRESSURE: 67 MMHG | HEIGHT: 66 IN | HEART RATE: 74 BPM | SYSTOLIC BLOOD PRESSURE: 135 MMHG | TEMPERATURE: 98.2 F | WEIGHT: 173 LBS

## 2018-08-19 DIAGNOSIS — S62.101A CLOSED FRACTURE OF RIGHT WRIST, INITIAL ENCOUNTER: Primary | ICD-10-CM

## 2018-08-19 PROCEDURE — 73110 X-RAY EXAM OF WRIST: CPT

## 2018-08-19 PROCEDURE — 99283 EMERGENCY DEPT VISIT LOW MDM: CPT

## 2018-08-19 RX ORDER — HYDROCODONE BITARTRATE AND ACETAMINOPHEN 5; 325 MG/1; MG/1
1 TABLET ORAL ONCE
Status: COMPLETED | OUTPATIENT
Start: 2018-08-19 | End: 2018-08-19

## 2018-08-19 RX ORDER — IBUPROFEN 600 MG/1
600 TABLET ORAL EVERY 6 HOURS PRN
Qty: 30 TABLET | Refills: 0 | Status: SHIPPED | OUTPATIENT
Start: 2018-08-19 | End: 2018-08-19

## 2018-08-19 RX ADMIN — HYDROCODONE BITARTRATE AND ACETAMINOPHEN 1 TABLET: 5; 325 TABLET ORAL at 12:40

## 2018-08-19 NOTE — ED PROVIDER NOTES
Subjective   58-year-old female presents to the ED with chief complaint of right wrist injury.  Patient indicates that she broke her wrist approximately 1 week ago and was placed in a splint in the emergency department.  Patient presents today stating that she exacerbated the injury and is now having increased pain at the site.  The re-injury occurred when the patient was attempting to open a small can of peaches.  She denies numbness tingling.  No weakness. No other complaints.             Review of Systems   Constitutional: Negative for fatigue and fever.   Respiratory: Negative for cough, chest tightness, shortness of breath and wheezing.    Cardiovascular: Negative for chest pain, palpitations and leg swelling.   Gastrointestinal: Negative for abdominal pain, diarrhea, nausea and vomiting.   Genitourinary: Negative for dysuria and flank pain.   Musculoskeletal: Positive for arthralgias. Negative for back pain and myalgias.        Right wrist pain     Neurological: Negative for dizziness, syncope and numbness.       Past Medical History:   Diagnosis Date   • Anxiety    • Arthritis    • COPD (chronic obstructive pulmonary disease) (CMS/HCC)    • Esophagitis    • History of tattoo    • Hypertension        Allergies   Allergen Reactions   • Sulfa Antibiotics Hives     ITCHING ,FLUSHING, SEVERE LOWER BACK PAIN       Past Surgical History:   Procedure Laterality Date   • COLONOSCOPY     • COLONOSCOPY N/A 4/11/2018    Procedure: COLONOSCOPY WITH SNARE POLYPECTOMY;  Surgeon: Tan Nolan MD;  Location: [facility] ENDOSCOPY;  Service: Gastroenterology   • UPPER GASTROINTESTINAL ENDOSCOPY         Family History   Problem Relation Age of Onset   • Cirrhosis Other        Social History     Social History   • Marital status: Single     Social History Main Topics   • Smoking status: Former Smoker   • Smokeless tobacco: Never Used      Comment: QUIT DEC 2017   • Alcohol use Yes      Comment: stopped in Nov 2016/OCCASIONAL   •  Drug use: No   • Sexual activity: Defer     Other Topics Concern   • Not on file           Objective   Physical Exam   Constitutional: She is oriented to person, place, and time. No distress.   HENT:   Head: Normocephalic and atraumatic.   Nose: Nose normal.   Eyes: Pupils are equal, round, and reactive to light. Conjunctivae and EOM are normal.   Neck: No JVD present. No tracheal deviation present.   Cardiovascular: Normal rate, regular rhythm and normal heart sounds.    No murmur heard.  Pulmonary/Chest: Effort normal and breath sounds normal. No respiratory distress.   Abdominal: Soft. Bowel sounds are normal. She exhibits no distension. There is no tenderness. There is no guarding.   Musculoskeletal: She exhibits no edema or deformity.   Splint in place over right wrist, small amount of edema right fingers, neurovascularly intact    Neurological: She is alert and oriented to person, place, and time. No cranial nerve deficit.   Skin: Skin is warm and dry. She is not diaphoretic.   Nursing note and vitals reviewed.      Procedures           ED Course        58-year-old female with a known distal radius fracture.  Concerned that she reinjured the wrist all opening peaches.  X-ray shows no increased angulation of the fracture.  She has follow-up with orthopedics tomorrow.  Patient will be discharged.  Follow-up as needed.          MDM      Final diagnoses:   Closed fracture of right wrist, initial encounter            Wood Medellin,   08/19/18 4808

## 2018-09-04 ENCOUNTER — APPOINTMENT (OUTPATIENT)
Dept: PREADMISSION TESTING | Facility: HOSPITAL | Age: 58
End: 2018-09-04

## 2018-09-04 VITALS
BODY MASS INDEX: 27.8 KG/M2 | WEIGHT: 173 LBS | SYSTOLIC BLOOD PRESSURE: 181 MMHG | HEIGHT: 66 IN | DIASTOLIC BLOOD PRESSURE: 84 MMHG | OXYGEN SATURATION: 96 % | HEART RATE: 80 BPM

## 2018-09-04 LAB
ANION GAP SERPL CALCULATED.3IONS-SCNC: 12.1 MMOL/L (ref 10–20)
BUN BLD-MCNC: 5 MG/DL (ref 7–20)
BUN/CREAT SERPL: 10 (ref 7.1–23.5)
CALCIUM SPEC-SCNC: 9.9 MG/DL (ref 8.4–10.2)
CHLORIDE SERPL-SCNC: 104 MMOL/L (ref 98–107)
CO2 SERPL-SCNC: 28 MMOL/L (ref 26–30)
CREAT BLD-MCNC: 0.5 MG/DL (ref 0.6–1.3)
DEPRECATED RDW RBC AUTO: 55.7 FL (ref 37–54)
ERYTHROCYTE [DISTWIDTH] IN BLOOD BY AUTOMATED COUNT: 14.8 % (ref 11.5–14.5)
GFR SERPL CREATININE-BSD FRML MDRD: 126 ML/MIN/1.73
GLUCOSE BLD-MCNC: 94 MG/DL (ref 74–98)
HCT VFR BLD AUTO: 42 % (ref 37–47)
HGB BLD-MCNC: 13.9 G/DL (ref 12–16)
MCH RBC QN AUTO: 33.7 PG (ref 27–31)
MCHC RBC AUTO-ENTMCNC: 33.1 G/DL (ref 30–37)
MCV RBC AUTO: 101.9 FL (ref 81–99)
PLATELET # BLD AUTO: 232 10*3/MM3 (ref 130–400)
PMV BLD AUTO: 11 FL (ref 6–12)
POTASSIUM BLD-SCNC: 4.1 MMOL/L (ref 3.5–5.1)
RBC # BLD AUTO: 4.12 10*6/MM3 (ref 4.2–5.4)
SODIUM BLD-SCNC: 140 MMOL/L (ref 137–145)
WBC NRBC COR # BLD: 5.89 10*3/MM3 (ref 4.8–10.8)

## 2018-09-04 PROCEDURE — 93005 ELECTROCARDIOGRAM TRACING: CPT | Performed by: ORTHOPAEDIC SURGERY

## 2018-09-04 PROCEDURE — 85027 COMPLETE CBC AUTOMATED: CPT | Performed by: ORTHOPAEDIC SURGERY

## 2018-09-04 PROCEDURE — 80048 BASIC METABOLIC PNL TOTAL CA: CPT | Performed by: ORTHOPAEDIC SURGERY

## 2018-09-04 PROCEDURE — 36415 COLL VENOUS BLD VENIPUNCTURE: CPT

## 2018-09-04 RX ORDER — ACETAMINOPHEN AND CODEINE PHOSPHATE 300; 30 MG/1; MG/1
1 TABLET ORAL EVERY 6 HOURS PRN
COMMUNITY
End: 2020-06-27

## 2018-09-04 RX ORDER — ATORVASTATIN CALCIUM 20 MG/1
20 TABLET, FILM COATED ORAL DAILY
COMMUNITY

## 2018-09-05 ENCOUNTER — APPOINTMENT (OUTPATIENT)
Dept: GENERAL RADIOLOGY | Facility: HOSPITAL | Age: 58
End: 2018-09-05

## 2018-09-05 ENCOUNTER — ANESTHESIA EVENT (OUTPATIENT)
Dept: PERIOP | Facility: HOSPITAL | Age: 58
End: 2018-09-05

## 2018-09-05 ENCOUNTER — ANESTHESIA (OUTPATIENT)
Dept: PERIOP | Facility: HOSPITAL | Age: 58
End: 2018-09-05

## 2018-09-05 ENCOUNTER — HOSPITAL ENCOUNTER (OUTPATIENT)
Facility: HOSPITAL | Age: 58
Setting detail: HOSPITAL OUTPATIENT SURGERY
Discharge: HOME | End: 2018-09-05
Attending: ORTHOPAEDIC SURGERY | Admitting: ORTHOPAEDIC SURGERY

## 2018-09-05 VITALS
OXYGEN SATURATION: 93 % | HEART RATE: 75 BPM | TEMPERATURE: 98.5 F | DIASTOLIC BLOOD PRESSURE: 82 MMHG | SYSTOLIC BLOOD PRESSURE: 160 MMHG | RESPIRATION RATE: 20 BRPM

## 2018-09-05 PROCEDURE — C1713 ANCHOR/SCREW BN/BN,TIS/BN: HCPCS | Performed by: ORTHOPAEDIC SURGERY

## 2018-09-05 PROCEDURE — 25010000002 DEXAMETHASONE PER 1 MG: Performed by: NURSE ANESTHETIST, CERTIFIED REGISTERED

## 2018-09-05 PROCEDURE — 25010000002 MIDAZOLAM PER 1 MG: Performed by: NURSE ANESTHETIST, CERTIFIED REGISTERED

## 2018-09-05 PROCEDURE — 25010000003 CEFAZOLIN PER 500 MG: Performed by: ORTHOPAEDIC SURGERY

## 2018-09-05 PROCEDURE — 25010000002 KETOROLAC TROMETHAMINE PER 15 MG: Performed by: NURSE ANESTHETIST, CERTIFIED REGISTERED

## 2018-09-05 PROCEDURE — 25010000002 DEXAMETHASONE SODIUM PHOSPHATE 10 MG/ML SOLUTION: Performed by: NURSE ANESTHETIST, CERTIFIED REGISTERED

## 2018-09-05 PROCEDURE — 76000 FLUOROSCOPY <1 HR PHYS/QHP: CPT

## 2018-09-05 PROCEDURE — 25010000002 FENTANYL CITRATE (PF) 100 MCG/2ML SOLUTION: Performed by: NURSE ANESTHETIST, CERTIFIED REGISTERED

## 2018-09-05 PROCEDURE — 25010000002 PROPOFOL 200 MG/20ML EMULSION: Performed by: NURSE ANESTHETIST, CERTIFIED REGISTERED

## 2018-09-05 PROCEDURE — 25010000002 HYDROMORPHONE PER 4 MG

## 2018-09-05 PROCEDURE — 25010000002 ONDANSETRON PER 1 MG: Performed by: NURSE ANESTHETIST, CERTIFIED REGISTERED

## 2018-09-05 DEVICE — PLT GEMINUS NRW 3H RT: Type: IMPLANTABLE DEVICE | Site: WRIST | Status: FUNCTIONAL

## 2018-09-05 DEVICE — SCRW GEMINUS PA NL TI 3.5X16MM: Type: IMPLANTABLE DEVICE | Site: WRIST | Status: FUNCTIONAL

## 2018-09-05 DEVICE — IMPLANTABLE DEVICE: Type: IMPLANTABLE DEVICE | Site: WRIST | Status: FUNCTIONAL

## 2018-09-05 DEVICE — PEG GEMINUS THRD LK TI 2.3X20MM: Type: IMPLANTABLE DEVICE | Site: WRIST | Status: FUNCTIONAL

## 2018-09-05 DEVICE — PEG VOLR GEMINUS LK HI COMPR TI 2.7X20MM: Type: IMPLANTABLE DEVICE | Site: WRIST | Status: FUNCTIONAL

## 2018-09-05 DEVICE — SCRW CORT GEMINUS LK TI 3.5X14MM: Type: IMPLANTABLE DEVICE | Site: WRIST | Status: FUNCTIONAL

## 2018-09-05 DEVICE — PEG GEMINUS THRD LK TI 2.3X18MM: Type: IMPLANTABLE DEVICE | Site: WRIST | Status: FUNCTIONAL

## 2018-09-05 RX ORDER — DEXAMETHASONE SODIUM PHOSPHATE 10 MG/ML
INJECTION, SOLUTION INTRAMUSCULAR; INTRAVENOUS
Status: COMPLETED
Start: 2018-09-05 | End: 2018-09-05

## 2018-09-05 RX ORDER — ONDANSETRON 2 MG/ML
INJECTION INTRAMUSCULAR; INTRAVENOUS AS NEEDED
Status: DISCONTINUED | OUTPATIENT
Start: 2018-09-05 | End: 2018-09-05 | Stop reason: SURG

## 2018-09-05 RX ORDER — PROPOFOL 10 MG/ML
INJECTION, EMULSION INTRAVENOUS AS NEEDED
Status: DISCONTINUED | OUTPATIENT
Start: 2018-09-05 | End: 2018-09-05 | Stop reason: SURG

## 2018-09-05 RX ORDER — SODIUM CHLORIDE, SODIUM LACTATE, POTASSIUM CHLORIDE, CALCIUM CHLORIDE 600; 310; 30; 20 MG/100ML; MG/100ML; MG/100ML; MG/100ML
1000 INJECTION, SOLUTION INTRAVENOUS CONTINUOUS
Status: DISCONTINUED | OUTPATIENT
Start: 2018-09-05 | End: 2018-09-05 | Stop reason: HOSPADM

## 2018-09-05 RX ORDER — MEPERIDINE HYDROCHLORIDE 50 MG/ML
25 INJECTION INTRAMUSCULAR; INTRAVENOUS; SUBCUTANEOUS
Status: DISCONTINUED | OUTPATIENT
Start: 2018-09-05 | End: 2018-09-05 | Stop reason: HOSPADM

## 2018-09-05 RX ORDER — HYDROCODONE BITARTRATE AND ACETAMINOPHEN 7.5; 325 MG/1; MG/1
1-2 TABLET ORAL EVERY 4 HOURS PRN
Qty: 50 TABLET | Refills: 0 | Status: SHIPPED | OUTPATIENT
Start: 2018-09-05 | End: 2018-12-12

## 2018-09-05 RX ORDER — DEXAMETHASONE SODIUM PHOSPHATE 10 MG/ML
INJECTION, SOLUTION INTRAMUSCULAR; INTRAVENOUS AS NEEDED
Status: DISCONTINUED | OUTPATIENT
Start: 2018-09-05 | End: 2018-09-05 | Stop reason: SURG

## 2018-09-05 RX ORDER — MIDAZOLAM HYDROCHLORIDE 1 MG/ML
INJECTION INTRAMUSCULAR; INTRAVENOUS AS NEEDED
Status: DISCONTINUED | OUTPATIENT
Start: 2018-09-05 | End: 2018-09-05 | Stop reason: SURG

## 2018-09-05 RX ORDER — SODIUM CHLORIDE 0.9 % (FLUSH) 0.9 %
3 SYRINGE (ML) INJECTION AS NEEDED
Status: DISCONTINUED | OUTPATIENT
Start: 2018-09-05 | End: 2018-09-05 | Stop reason: HOSPADM

## 2018-09-05 RX ORDER — KETOROLAC TROMETHAMINE 30 MG/ML
INJECTION, SOLUTION INTRAMUSCULAR; INTRAVENOUS AS NEEDED
Status: DISCONTINUED | OUTPATIENT
Start: 2018-09-05 | End: 2018-09-05 | Stop reason: SURG

## 2018-09-05 RX ORDER — FENTANYL CITRATE 50 UG/ML
INJECTION, SOLUTION INTRAMUSCULAR; INTRAVENOUS AS NEEDED
Status: DISCONTINUED | OUTPATIENT
Start: 2018-09-05 | End: 2018-09-05 | Stop reason: SURG

## 2018-09-05 RX ORDER — DEXAMETHASONE SODIUM PHOSPHATE 4 MG/ML
INJECTION, SOLUTION INTRA-ARTICULAR; INTRALESIONAL; INTRAMUSCULAR; INTRAVENOUS; SOFT TISSUE AS NEEDED
Status: DISCONTINUED | OUTPATIENT
Start: 2018-09-05 | End: 2018-09-05 | Stop reason: SURG

## 2018-09-05 RX ORDER — BUPIVACAINE HYDROCHLORIDE 5 MG/ML
INJECTION, SOLUTION EPIDURAL; INTRACAUDAL
Status: DISCONTINUED
Start: 2018-09-05 | End: 2018-09-05 | Stop reason: HOSPADM

## 2018-09-05 RX ORDER — MIDAZOLAM HYDROCHLORIDE 1 MG/ML
INJECTION INTRAMUSCULAR; INTRAVENOUS
Status: COMPLETED
Start: 2018-09-05 | End: 2018-09-05

## 2018-09-05 RX ORDER — ONDANSETRON 2 MG/ML
4 INJECTION INTRAMUSCULAR; INTRAVENOUS ONCE AS NEEDED
Status: DISCONTINUED | OUTPATIENT
Start: 2018-09-05 | End: 2018-09-05 | Stop reason: HOSPADM

## 2018-09-05 RX ORDER — CLINDAMYCIN PHOSPHATE 900 MG/50ML
900 INJECTION, SOLUTION INTRAVENOUS ONCE
Status: CANCELLED | OUTPATIENT
Start: 2018-09-05 | End: 2018-09-05

## 2018-09-05 RX ORDER — FENTANYL CITRATE 50 UG/ML
INJECTION, SOLUTION INTRAMUSCULAR; INTRAVENOUS
Status: COMPLETED
Start: 2018-09-05 | End: 2018-09-05

## 2018-09-05 RX ADMIN — MIDAZOLAM HYDROCHLORIDE 2 MG: 1 INJECTION, SOLUTION INTRAMUSCULAR; INTRAVENOUS at 12:55

## 2018-09-05 RX ADMIN — HYDROMORPHONE HYDROCHLORIDE 0.5 MG: 1 INJECTION, SOLUTION INTRAMUSCULAR; INTRAVENOUS; SUBCUTANEOUS at 14:51

## 2018-09-05 RX ADMIN — FENTANYL CITRATE 50 MCG: 50 INJECTION, SOLUTION INTRAMUSCULAR; INTRAVENOUS at 14:20

## 2018-09-05 RX ADMIN — KETOROLAC TROMETHAMINE 30 MG: 30 INJECTION, SOLUTION INTRAMUSCULAR at 13:47

## 2018-09-05 RX ADMIN — HYDROMORPHONE HYDROCHLORIDE 0.5 MG: 1 INJECTION, SOLUTION INTRAMUSCULAR; INTRAVENOUS; SUBCUTANEOUS at 15:10

## 2018-09-05 RX ADMIN — ONDANSETRON 4 MG: 2 INJECTION INTRAMUSCULAR; INTRAVENOUS at 13:47

## 2018-09-05 RX ADMIN — SODIUM CHLORIDE, POTASSIUM CHLORIDE, SODIUM LACTATE AND CALCIUM CHLORIDE 1000 ML: 600; 310; 30; 20 INJECTION, SOLUTION INTRAVENOUS at 12:00

## 2018-09-05 RX ADMIN — Medication 0.5 MG: at 15:10

## 2018-09-05 RX ADMIN — PROPOFOL 200 MG: 10 INJECTION, EMULSION INTRAVENOUS at 13:44

## 2018-09-05 RX ADMIN — FENTANYL CITRATE 50 MCG: 50 INJECTION, SOLUTION INTRAMUSCULAR; INTRAVENOUS at 12:55

## 2018-09-05 RX ADMIN — CEFAZOLIN 2 G: 1 INJECTION, POWDER, FOR SOLUTION INTRAVENOUS at 13:39

## 2018-09-05 RX ADMIN — Medication 0.5 MG: at 14:51

## 2018-09-05 RX ADMIN — DEXAMETHASONE SODIUM PHOSPHATE 8 MG: 4 INJECTION, SOLUTION INTRAMUSCULAR; INTRAVENOUS at 13:47

## 2018-09-05 RX ADMIN — FENTANYL CITRATE 50 MCG: 50 INJECTION, SOLUTION INTRAMUSCULAR; INTRAVENOUS at 13:44

## 2018-09-05 RX ADMIN — DEXAMETHASONE SODIUM PHOSPHATE 10 MG: 10 INJECTION, SOLUTION INTRAMUSCULAR; INTRAVENOUS at 12:55

## 2018-09-05 NOTE — ANESTHESIA POSTPROCEDURE EVALUATION
Patient: Katelynn Ribeiro    Procedure Summary     Date:  09/05/18 Room / Location:  [facility] OR  /  [facility]    Anesthesia Start:  1339 Anesthesia Stop:      Procedure:  WRIST OPEN REDUCTION INTERNAL FIXATION DISTAL RADIUS RIGHT (SKELETAL DYNAMICS-IRIS HUTTON) (Right Hand) Diagnosis:       Displaced transverse fracture of shaft of right radius, initial encounter for closed fracture      (Displaced transverse fracture of shaft of right radius, initial encounter for closed fracture [S52.321A])    Surgeon:  Grant Dougherty MD Provider:  Bin Velarde CRNA    Anesthesia Type:  MAC ASA Status:  3          Anesthesia Type: MAC  Last vitals  BP   169/77   Temp   99.1   Pulse   83   Resp   20   SpO2   100     Post Anesthesia Care and Evaluation    Patient location during evaluation: PACU  Patient participation: complete - patient participated  Level of consciousness: awake and alert  Pain score: 0  Pain management: satisfactory to patient  Airway patency: patent  Anesthetic complications: No anesthetic complications  PONV Status: none  Cardiovascular status: acceptable and stable  Respiratory status: acceptable and face mask  Hydration status: acceptable

## 2018-09-05 NOTE — OP NOTE
Orthopedics WRIST OPEN REDUCTION INTERNAL FIXATION DISTAL RADIUS RIGHT (SKELETAL DYNAMICS-IRIS HUTTON)  Op Note    Katelynn Ribeiro  9/5/2018    Pre-op Diagnosis:   Right distal radial fracture 2 piece    Post-op Diagnosis:  Same  Procedure Right distal radial fracture open reduction internal fixation    Anesthesia:  General    Staff:   Circulator: Hue Vargas RN; Sheldon Jimenez RN  Scrub Person: Angela Pop      Specimens:None    Drains:  None    Indication  8-year-old female sustained injury to her right wrist she had a fall injuring that right wrist she had attempted treatment closed in a cast but had lost reduction was elected to proceed with operative intervention she understood this she understood the procedure risks benefits and decided to proceed      Procedure  Description  Patient was identified in the holding room her right wrist was marked.  She was taken to the operating room administered general anesthetic per anesthesia team.  She had been administered a block.  She was administered Ras operative antibiotics.  She was then prepped and draped in usual sterile fashion to the right upper extremity.  Tension made to the right upper extremity where it was exsanguinated with an Esmarch tourniquet was elevated to 250 mmHg.  Attention made to the right arm where longitudinal incision was made.  Dissection down through the subcutaneous tissue to the sheath over the FCR interval between the FCR and radial artery was utilized and opened up.  The pronator was elevated off the radial attachment.  The fracture site was identified it was reduced under direct vision and held with a 62 K wire.  Adequate alignment was noted on C-arm imaging.  A plate was then applied to the volar surface affixed proximally with a shaft distally with a combination of locked screws.  The remaining shaft screws were filled with the lock screws adequate alignment and position was noted on C-arm imaging on AP and  lateral views.  The wound was karen irrigated closed in 2 layers.  Sterile dressings were applied and patient was emerged from anesthesia and taken to PACU in stable condition    Complications:  None    Tourniquet:: 250 mellitus mercury    Dressing:Splint    Disposition:PACU    Grant Dougherty MD     Date: 9/5/2018  Time: 2:27 PM

## 2018-09-05 NOTE — DISCHARGE INSTRUCTIONS
Rest today  No pushing, pulling, tugging, heavy lifting, or strenuous activity   No major decision making,driving,or drinking alcoholic beverages for 24 hours due to the sedation you received  Always use good hand hygiene/washing technique  No driving on pain medication.    To assist you in voiding:  Drink plenty of fluids  Listen to running water while attempting to void.    If you are unable to urinate and you have an uncomfortable urge to void or it has been   6 hours since you were discharged, return to the Emergency Room    Keep the affected extremity elevated above level of the heart.  Use your ice pack as instructed, do not use continuously.  Use your crutches and or sling as directed   Follow your physicians instructions as previously directed.

## 2018-09-05 NOTE — ANESTHESIA PREPROCEDURE EVALUATION
Anesthesia Evaluation     Patient summary reviewed and Nursing notes reviewed   no history of anesthetic complications:  NPO Solid Status: > 8 hours  NPO Liquid Status: > 8 hours           Airway   Mallampati: II  TM distance: >3 FB  Neck ROM: full  no difficulty expected  Dental - normal exam   (+) lower dentures and upper dentures    Pulmonary - normal exam   (+) a smoker Former, COPD moderate,   Cardiovascular - normal exam    Rhythm: regular  Rate: normal    (+) hypertension well controlled, hyperlipidemia,     ROS comment: EKG SR possible left atrial enlargement    Neuro/Psych  (+) psychiatric history Anxiety, Depression and Bipolar,     GI/Hepatic/Renal/Endo    (+)  GERD well controlled, PUD,      Musculoskeletal     Abdominal    Substance History - negative use     OB/GYN negative ob/gyn ROS         Other   (+) arthritis     ROS/Med Hx Other: Wears O2 at hs  Uses MDIs daily                  Anesthesia Plan    ASA 3     MAC   (Pt told that intravenous sedation will be used as the primary anesthetic along with local anesthesia if necessary. Every effort will be made to make sure the patient is comfortable.     The patient was told they may or may not have recall for the procedure. It was further explained that if the MAC was not adequate that a general anesthetic with either an LMA or endotracheal tube would be required.     Risks and benefits of peripheral nerve blocks for pain control explained to patient to include infection, bleeding at the site, paresthesia, damage to nerves, and incomplete analgesia.Plan brachial plexus block for POPC to be done pre op.  Will proceed with the plan of care.)  intravenous induction   Anesthetic plan, all risks, benefits, and alternatives have been provided, discussed and informed consent has been obtained with: patient.

## 2018-09-05 NOTE — ANESTHESIA PROCEDURE NOTES
Airway  Urgency: elective    Airway not difficult    General Information and Staff    Patient location during procedure: OR  CRNA: SONAM FREY    Indications and Patient Condition  Indications for airway management: airway protection    Preoxygenated: yes  Mask difficulty assessment: 0 - not attempted    Final Airway Details  Final airway type: supraglottic airway      Successful airway: classic  Size 4    Number of attempts at approach: 1    Additional Comments  Cuff pressure/leak less than [id number]

## 2018-12-12 ENCOUNTER — HOSPITAL ENCOUNTER (OUTPATIENT)
Dept: GENERAL RADIOLOGY | Facility: HOSPITAL | Age: 58
Discharge: HOME | End: 2018-12-12
Admitting: ORTHOPAEDIC SURGERY

## 2018-12-12 ENCOUNTER — APPOINTMENT (OUTPATIENT)
Dept: PREADMISSION TESTING | Facility: HOSPITAL | Age: 58
End: 2018-12-12

## 2018-12-12 VITALS — BODY MASS INDEX: 26.52 KG/M2 | HEIGHT: 66 IN | WEIGHT: 165 LBS

## 2018-12-12 LAB
ALBUMIN SERPL-MCNC: 4.7 G/DL (ref 3.5–5)
ALBUMIN/GLOB SERPL: 1.5 G/DL (ref 1–2)
ALP SERPL-CCNC: 241 U/L (ref 38–126)
ALT SERPL W P-5'-P-CCNC: 54 U/L (ref 13–69)
ANION GAP SERPL CALCULATED.3IONS-SCNC: 14.6 MMOL/L (ref 10–20)
AST SERPL-CCNC: 48 U/L (ref 15–46)
BILIRUB SERPL-MCNC: 0.8 MG/DL (ref 0.2–1.3)
BUN BLD-MCNC: 12 MG/DL (ref 7–20)
BUN/CREAT SERPL: 17.1 (ref 7.1–23.5)
CALCIUM SPEC-SCNC: 9.6 MG/DL (ref 8.4–10.2)
CHLORIDE SERPL-SCNC: 96 MMOL/L (ref 98–107)
CO2 SERPL-SCNC: 24 MMOL/L (ref 26–30)
CREAT BLD-MCNC: 0.7 MG/DL (ref 0.6–1.3)
DEPRECATED RDW RBC AUTO: 45.7 FL (ref 37–54)
ERYTHROCYTE [DISTWIDTH] IN BLOOD BY AUTOMATED COUNT: 12.6 % (ref 11.5–14.5)
GFR SERPL CREATININE-BSD FRML MDRD: 86 ML/MIN/1.73
GLOBULIN UR ELPH-MCNC: 3.2 GM/DL
GLUCOSE BLD-MCNC: 86 MG/DL (ref 74–98)
HCT VFR BLD AUTO: 45 % (ref 37–47)
HGB BLD-MCNC: 15.6 G/DL (ref 12–16)
MCH RBC QN AUTO: 34.1 PG (ref 27–31)
MCHC RBC AUTO-ENTMCNC: 34.7 G/DL (ref 30–37)
MCV RBC AUTO: 98.3 FL (ref 81–99)
PLATELET # BLD AUTO: 177 10*3/MM3 (ref 130–400)
PMV BLD AUTO: 12 FL (ref 6–12)
POTASSIUM BLD-SCNC: 4.6 MMOL/L (ref 3.5–5.1)
PROT SERPL-MCNC: 7.9 G/DL (ref 6.3–8.2)
RBC # BLD AUTO: 4.58 10*6/MM3 (ref 4.2–5.4)
SODIUM BLD-SCNC: 130 MMOL/L (ref 137–145)
WBC NRBC COR # BLD: 6.59 10*3/MM3 (ref 4.8–10.8)

## 2018-12-12 PROCEDURE — 85027 COMPLETE CBC AUTOMATED: CPT | Performed by: ORTHOPAEDIC SURGERY

## 2018-12-12 PROCEDURE — 80053 COMPREHEN METABOLIC PANEL: CPT | Performed by: ORTHOPAEDIC SURGERY

## 2018-12-12 PROCEDURE — 71045 X-RAY EXAM CHEST 1 VIEW: CPT

## 2018-12-12 PROCEDURE — 36415 COLL VENOUS BLD VENIPUNCTURE: CPT

## 2018-12-19 ENCOUNTER — ANESTHESIA EVENT (OUTPATIENT)
Dept: PERIOP | Facility: HOSPITAL | Age: 58
End: 2018-12-19

## 2018-12-19 ENCOUNTER — ANESTHESIA (OUTPATIENT)
Dept: PERIOP | Facility: HOSPITAL | Age: 58
End: 2018-12-19

## 2018-12-19 ENCOUNTER — HOSPITAL ENCOUNTER (OUTPATIENT)
Facility: HOSPITAL | Age: 58
Setting detail: HOSPITAL OUTPATIENT SURGERY
Discharge: HOME | End: 2018-12-19
Attending: ORTHOPAEDIC SURGERY | Admitting: ORTHOPAEDIC SURGERY

## 2018-12-19 ENCOUNTER — APPOINTMENT (OUTPATIENT)
Dept: GENERAL RADIOLOGY | Facility: HOSPITAL | Age: 58
End: 2018-12-19

## 2018-12-19 VITALS
HEART RATE: 68 BPM | DIASTOLIC BLOOD PRESSURE: 50 MMHG | RESPIRATION RATE: 16 BRPM | OXYGEN SATURATION: 93 % | SYSTOLIC BLOOD PRESSURE: 97 MMHG | TEMPERATURE: 98.6 F

## 2018-12-19 PROCEDURE — 25010000002 PROPOFOL 200 MG/20ML EMULSION: Performed by: NURSE ANESTHETIST, CERTIFIED REGISTERED

## 2018-12-19 PROCEDURE — 25010000002 FENTANYL CITRATE (PF) 100 MCG/2ML SOLUTION: Performed by: NURSE ANESTHETIST, CERTIFIED REGISTERED

## 2018-12-19 PROCEDURE — 25010000002 HYDROMORPHONE 1 MG/ML SOLUTION

## 2018-12-19 PROCEDURE — 25010000002 ONDANSETRON PER 1 MG: Performed by: NURSE ANESTHETIST, CERTIFIED REGISTERED

## 2018-12-19 PROCEDURE — 25010000002 METOCLOPRAMIDE PER 10 MG: Performed by: NURSE ANESTHETIST, CERTIFIED REGISTERED

## 2018-12-19 PROCEDURE — 25010000002 DEXAMETHASONE PER 1 MG: Performed by: NURSE ANESTHETIST, CERTIFIED REGISTERED

## 2018-12-19 PROCEDURE — 25010000002 MIDAZOLAM PER 1 MG: Performed by: NURSE ANESTHETIST, CERTIFIED REGISTERED

## 2018-12-19 PROCEDURE — 25010000003 CEFAZOLIN SODIUM-DEXTROSE 1-4 GM-%(50ML) RECONSTITUTED SOLUTION: Performed by: ORTHOPAEDIC SURGERY

## 2018-12-19 RX ORDER — MEPERIDINE HYDROCHLORIDE 50 MG/ML
25 INJECTION INTRAMUSCULAR; INTRAVENOUS; SUBCUTANEOUS
Status: DISCONTINUED | OUTPATIENT
Start: 2018-12-19 | End: 2018-12-19 | Stop reason: HOSPADM

## 2018-12-19 RX ORDER — PROPOFOL 10 MG/ML
INJECTION, EMULSION INTRAVENOUS AS NEEDED
Status: DISCONTINUED | OUTPATIENT
Start: 2018-12-19 | End: 2018-12-19 | Stop reason: SURG

## 2018-12-19 RX ORDER — ONDANSETRON 2 MG/ML
4 INJECTION INTRAMUSCULAR; INTRAVENOUS ONCE AS NEEDED
Status: DISCONTINUED | OUTPATIENT
Start: 2018-12-19 | End: 2018-12-19 | Stop reason: HOSPADM

## 2018-12-19 RX ORDER — FENTANYL CITRATE 50 UG/ML
INJECTION, SOLUTION INTRAMUSCULAR; INTRAVENOUS
Status: COMPLETED
Start: 2018-12-19 | End: 2018-12-19

## 2018-12-19 RX ORDER — MIDAZOLAM HYDROCHLORIDE 1 MG/ML
INJECTION INTRAMUSCULAR; INTRAVENOUS
Status: COMPLETED
Start: 2018-12-19 | End: 2018-12-19

## 2018-12-19 RX ORDER — ROPIVACAINE HYDROCHLORIDE 5 MG/ML
INJECTION, SOLUTION EPIDURAL; INFILTRATION; PERINEURAL
Status: DISCONTINUED
Start: 2018-12-19 | End: 2018-12-19 | Stop reason: HOSPADM

## 2018-12-19 RX ORDER — ONDANSETRON 2 MG/ML
INJECTION INTRAMUSCULAR; INTRAVENOUS AS NEEDED
Status: DISCONTINUED | OUTPATIENT
Start: 2018-12-19 | End: 2018-12-19 | Stop reason: SURG

## 2018-12-19 RX ORDER — DEXAMETHASONE SODIUM PHOSPHATE 4 MG/ML
INJECTION, SOLUTION INTRA-ARTICULAR; INTRALESIONAL; INTRAMUSCULAR; INTRAVENOUS; SOFT TISSUE AS NEEDED
Status: DISCONTINUED | OUTPATIENT
Start: 2018-12-19 | End: 2018-12-19 | Stop reason: SURG

## 2018-12-19 RX ORDER — LIDOCAINE HYDROCHLORIDE 20 MG/ML
INJECTION, SOLUTION INFILTRATION; PERINEURAL AS NEEDED
Status: DISCONTINUED | OUTPATIENT
Start: 2018-12-19 | End: 2018-12-19 | Stop reason: SURG

## 2018-12-19 RX ORDER — MORPHINE SULFATE 2 MG/ML
2 INJECTION, SOLUTION INTRAMUSCULAR; INTRAVENOUS
Status: DISCONTINUED | OUTPATIENT
Start: 2018-12-19 | End: 2018-12-19 | Stop reason: HOSPADM

## 2018-12-19 RX ORDER — DEXAMETHASONE SODIUM PHOSPHATE 10 MG/ML
INJECTION, SOLUTION INTRAMUSCULAR; INTRAVENOUS
Status: DISCONTINUED
Start: 2018-12-19 | End: 2018-12-19 | Stop reason: HOSPADM

## 2018-12-19 RX ORDER — HYDROCODONE BITARTRATE AND ACETAMINOPHEN 5; 325 MG/1; MG/1
1 TABLET ORAL EVERY 4 HOURS PRN
Qty: 30 TABLET | Refills: 0 | OUTPATIENT
Start: 2018-12-19 | End: 2020-06-27

## 2018-12-19 RX ORDER — BUPIVACAINE HYDROCHLORIDE 5 MG/ML
INJECTION, SOLUTION EPIDURAL; INTRACAUDAL AS NEEDED
Status: DISCONTINUED | OUTPATIENT
Start: 2018-12-19 | End: 2018-12-19 | Stop reason: HOSPADM

## 2018-12-19 RX ORDER — SODIUM CHLORIDE, SODIUM LACTATE, POTASSIUM CHLORIDE, CALCIUM CHLORIDE 600; 310; 30; 20 MG/100ML; MG/100ML; MG/100ML; MG/100ML
1000 INJECTION, SOLUTION INTRAVENOUS CONTINUOUS
Status: DISCONTINUED | OUTPATIENT
Start: 2018-12-19 | End: 2018-12-19 | Stop reason: HOSPADM

## 2018-12-19 RX ORDER — SODIUM CHLORIDE 0.9 % (FLUSH) 0.9 %
3 SYRINGE (ML) INJECTION AS NEEDED
Status: DISCONTINUED | OUTPATIENT
Start: 2018-12-19 | End: 2018-12-19 | Stop reason: HOSPADM

## 2018-12-19 RX ORDER — MIDAZOLAM HYDROCHLORIDE 1 MG/ML
INJECTION INTRAMUSCULAR; INTRAVENOUS AS NEEDED
Status: DISCONTINUED | OUTPATIENT
Start: 2018-12-19 | End: 2018-12-19 | Stop reason: SURG

## 2018-12-19 RX ORDER — CEFAZOLIN SODIUM 1 G/50ML
1 SOLUTION INTRAVENOUS ONCE
Status: COMPLETED | OUTPATIENT
Start: 2018-12-19 | End: 2018-12-19

## 2018-12-19 RX ORDER — FENTANYL CITRATE 50 UG/ML
INJECTION, SOLUTION INTRAMUSCULAR; INTRAVENOUS AS NEEDED
Status: DISCONTINUED | OUTPATIENT
Start: 2018-12-19 | End: 2018-12-19 | Stop reason: SURG

## 2018-12-19 RX ORDER — METOCLOPRAMIDE HYDROCHLORIDE 5 MG/ML
INJECTION INTRAMUSCULAR; INTRAVENOUS AS NEEDED
Status: DISCONTINUED | OUTPATIENT
Start: 2018-12-19 | End: 2018-12-19 | Stop reason: SURG

## 2018-12-19 RX ADMIN — PROPOFOL 150 MG: 10 INJECTION, EMULSION INTRAVENOUS at 07:38

## 2018-12-19 RX ADMIN — METOCLOPRAMIDE 10 MG: 5 INJECTION, SOLUTION INTRAMUSCULAR; INTRAVENOUS at 07:38

## 2018-12-19 RX ADMIN — HYDROMORPHONE HYDROCHLORIDE 0.5 MG: 1 INJECTION, SOLUTION INTRAMUSCULAR; INTRAVENOUS; SUBCUTANEOUS at 08:21

## 2018-12-19 RX ADMIN — CEFAZOLIN SODIUM 1 G: 1 SOLUTION INTRAVENOUS at 07:32

## 2018-12-19 RX ADMIN — DEXAMETHASONE SODIUM PHOSPHATE 8 MG: 4 INJECTION, SOLUTION INTRAMUSCULAR; INTRAVENOUS at 07:38

## 2018-12-19 RX ADMIN — HYDROMORPHONE HYDROCHLORIDE 0.5 MG: 1 INJECTION, SOLUTION INTRAMUSCULAR; INTRAVENOUS; SUBCUTANEOUS at 08:32

## 2018-12-19 RX ADMIN — FENTANYL CITRATE 100 MCG: 50 INJECTION, SOLUTION INTRAMUSCULAR; INTRAVENOUS at 07:38

## 2018-12-19 RX ADMIN — SODIUM CHLORIDE, POTASSIUM CHLORIDE, SODIUM LACTATE AND CALCIUM CHLORIDE 1000 ML: 600; 310; 30; 20 INJECTION, SOLUTION INTRAVENOUS at 07:27

## 2018-12-19 RX ADMIN — Medication 0.5 MG: at 08:21

## 2018-12-19 RX ADMIN — MIDAZOLAM HYDROCHLORIDE 2 MG: 1 INJECTION, SOLUTION INTRAMUSCULAR; INTRAVENOUS at 07:30

## 2018-12-19 RX ADMIN — PROPOFOL 50 MG: 10 INJECTION, EMULSION INTRAVENOUS at 07:58

## 2018-12-19 RX ADMIN — LIDOCAINE HYDROCHLORIDE 100 MG: 20 INJECTION, SOLUTION INFILTRATION; PERINEURAL at 07:38

## 2018-12-19 RX ADMIN — Medication 0.5 MG: at 08:32

## 2018-12-19 RX ADMIN — ONDANSETRON 4 MG: 2 INJECTION INTRAMUSCULAR; INTRAVENOUS at 07:38

## 2018-12-19 NOTE — ANESTHESIA POSTPROCEDURE EVALUATION
Patient: Katelynn Ribeiro    Procedure Summary     Date:  12/19/18 Room / Location:  [facility] OR  /  [facility] OR    Anesthesia Start:  0732 Anesthesia Stop:  0812    Procedure:  RIGHT THUMB EXTENSOR INDICUS PROPRIUS TO EXTENSOR POLLICUS LONGUS TENDON EXPLORATION (Right Wrist) Diagnosis:       Displaced transverse fracture of shaft of right radius, subsequent encounter for closed fracture with routine healing      (Displaced transverse fracture of shaft of right radius, subsequent encounter for closed fracture with routine healing [S52.561D])    Surgeon:  Grant Dougherty MD Provider:  Chato Ascencio CRNA    Anesthesia Type:  general ASA Status:  3          Anesthesia Type: general  Last vitals  BP   111/44 (12/19/18 0845)   Temp   98 °F (36.7 °C) (12/19/18 0815)   Pulse   70 (12/19/18 0845)   Resp   12 (12/19/18 0845)     SpO2   94 % (12/19/18 0845)     Post Anesthesia Care and Evaluation    Patient location during evaluation: PACU  Patient participation: complete - patient participated  Level of consciousness: awake  Pain score: 3  Pain management: adequate  Airway patency: patent  Anesthetic complications: No anesthetic complications  PONV Status: controlled  Cardiovascular status: acceptable and stable  Respiratory status: acceptable and face mask  Hydration status: acceptable

## 2018-12-19 NOTE — ANESTHESIA PREPROCEDURE EVALUATION
Anesthesia Evaluation     Patient summary reviewed and Nursing notes reviewed   no history of anesthetic complications:  NPO Solid Status: > 8 hours  NPO Liquid Status: > 8 hours           Airway   Mallampati: II  TM distance: >3 FB  Neck ROM: full  no difficulty expected  Dental - normal exam     Pulmonary - normal exam   (+) a smoker Former, COPD,   Cardiovascular - normal exam    ECG reviewed  Rhythm: regular  Rate: normal    (+) hypertension, hyperlipidemia,       Neuro/Psych  (+) psychiatric history Anxiety and Depression,     GI/Hepatic/Renal/Endo    (+)  GERD,      Musculoskeletal     Abdominal    Substance History - negative use     OB/GYN negative ob/gyn ROS         Other   (+) arthritis                     Anesthesia Plan    ASA 3     general   (Risks and benefits discussed including risk of aspiration, recall and dental damage. All patient questions answered.    Patient told that either a breathing mask or a breathing tube will be used to manage the airway.    Block placement for POPC. Pt agreeable.    Will continue with plan of care.)  intravenous induction   Anesthetic plan, all risks, benefits, and alternatives have been provided, discussed and informed consent has been obtained with: patient.

## 2018-12-19 NOTE — ANESTHESIA PROCEDURE NOTES
Airway  Urgency: elective    Airway not difficult    General Information and Staff    Patient location during procedure: OR  CRNA: Chato Ascencio CRNA    Indications and Patient Condition  Indications for airway management: airway protection    Preoxygenated: yes  Mask difficulty assessment: 1 - vent by mask    Final Airway Details  Final airway type: supraglottic airway      Successful airway: unique  Size 3    Number of attempts at approach: 1

## 2018-12-19 NOTE — OP NOTE
Orthopedics RIGHT THUMB EXTENSOR INDICUS PROPRIUS TO EXTENSOR POLLICUS LONGUS TENDON EXPLORATION  Op Note    Katelynn Ribeiro  12/19/2018    Pre-op Diagnosis:   Extensor pollicis longus tendon rupture    Post-op Diagnosis:  Extensor pollicis longus tendon adhesions    ProcedureLeft wrist EPL exploration with adhsion debridement    Anesthesia:  General    Staff:   Circulator: Sheldon Jimenez RN  Scrub Person: Keyur Bush; Yuliet Solorio      Specimens: None      Drains:  None    Indication  This is a 58-year-old female who is status post ORIF of a distal radius fracturemonths ago had onset of lack of full extension of that thumb was felt to be consistent with an EPL rupture and elected to proceed with operative intervention for repair versus EIP to EPL transfer she understood this she understood the procedure risks and benefits and elected to proceed.      Procedure  Description  Patient was identified in the holding room her left hand was marked taking operating room and administered a general anesthetic per anesthesia team administered perioperative antibiotics.  She was prepped and draped usual sterile fashion attention made to her left hand wears identified as correct side and Miss Ribeiro is correct sign patient.  Tension made to the left hand where was exsanguinated with an Esmarch Turks elevated 250 mmHg tension made to the left hand where longitudinal incision was made dissection down through subcutaneous tissues tissue over the EPL tendon EPL tendon was identified and protected it was dissected proximally was noted to be intact that had adhesions present.  These were debrided under direct vision.  The wound was copiously irrigated closed with #2 layerslocal anesthetic was applied patient was emerged from anesthesia and taken to PACU in stable condition    Complications:  None      Grant Dougherty MD     Date: 12/19/2018  Time: 8:10 AM

## 2018-12-19 NOTE — DISCHARGE INSTRUCTIONS
Please follow all post op instructions and follow up appointment time from your physician's office included in your discharge packet.    Keep the affected extremity elevated above  level of the heart.  Use your ice pack as instructed, do not use continuously.  Use your sling as directed    Follow your physicians instructions as previously directed.    No pushing, pulling, tugging,  heavy lifting, or strenuous activity.  No major decision making, driving, or drinking alcoholic beverages for 24 hours. ( due to the medications you have  received)  Always use good hand hygiene/washing techniques.  NO driving while taking pain medications.    To assist you in voiding:  Drink plenty of fluids  Listen to running water while attempting to void.    If you are unable to urinate and you have an uncomfortable urge to void or it has been   6 hours since you were discharged, return to the Emergency Room

## 2019-03-20 ENCOUNTER — LAB (OUTPATIENT)
Dept: LAB | Facility: HOSPITAL | Age: 59
End: 2019-03-20

## 2019-03-20 ENCOUNTER — HOSPITAL ENCOUNTER (EMERGENCY)
Facility: HOSPITAL | Age: 59
Discharge: HOME | End: 2019-03-20
Attending: EMERGENCY MEDICINE | Admitting: EMERGENCY MEDICINE

## 2019-03-20 ENCOUNTER — TRANSCRIBE ORDERS (OUTPATIENT)
Dept: LAB | Facility: HOSPITAL | Age: 59
End: 2019-03-20

## 2019-03-20 VITALS
WEIGHT: 171.8 LBS | TEMPERATURE: 98.3 F | RESPIRATION RATE: 18 BRPM | DIASTOLIC BLOOD PRESSURE: 88 MMHG | HEART RATE: 74 BPM | SYSTOLIC BLOOD PRESSURE: 159 MMHG | BODY MASS INDEX: 27.61 KG/M2 | HEIGHT: 66 IN | OXYGEN SATURATION: 97 %

## 2019-03-20 DIAGNOSIS — M25.50 MULTIPLE JOINT PAIN: ICD-10-CM

## 2019-03-20 DIAGNOSIS — M25.541 PAIN IN THUMB JOINT WITH MOVEMENT, RIGHT: Primary | ICD-10-CM

## 2019-03-20 DIAGNOSIS — M25.50 MULTIPLE JOINT PAIN: Primary | ICD-10-CM

## 2019-03-20 LAB
BASOPHILS # BLD AUTO: 0.03 10*3/MM3 (ref 0–0.2)
BASOPHILS NFR BLD AUTO: 0.3 % (ref 0–2.5)
CRP SERPL-MCNC: 0.6 MG/DL (ref 0–1)
DEPRECATED RDW RBC AUTO: 48.2 FL (ref 37–54)
EOSINOPHIL # BLD AUTO: 0.18 10*3/MM3 (ref 0–0.7)
EOSINOPHIL NFR BLD AUTO: 1.8 % (ref 0–7)
ERYTHROCYTE [DISTWIDTH] IN BLOOD BY AUTOMATED COUNT: 13.1 % (ref 11.5–14.5)
ERYTHROCYTE [SEDIMENTATION RATE] IN BLOOD: 17 MM/HR (ref 0–20)
HCT VFR BLD AUTO: 42.5 % (ref 37–47)
HGB BLD-MCNC: 14.1 G/DL (ref 12–16)
IMM GRANULOCYTES # BLD AUTO: 0.05 10*3/MM3 (ref 0–0.06)
IMM GRANULOCYTES NFR BLD AUTO: 0.5 % (ref 0–0.6)
LYMPHOCYTES # BLD AUTO: 2.72 10*3/MM3 (ref 0.6–3.4)
LYMPHOCYTES NFR BLD AUTO: 27.9 % (ref 10–50)
MCH RBC QN AUTO: 33.3 PG (ref 27–31)
MCHC RBC AUTO-ENTMCNC: 33.2 G/DL (ref 30–37)
MCV RBC AUTO: 100.5 FL (ref 81–99)
MONOCYTES # BLD AUTO: 0.91 10*3/MM3 (ref 0–0.9)
MONOCYTES NFR BLD AUTO: 9.3 % (ref 0–12)
NEUTROPHILS # BLD AUTO: 5.86 10*3/MM3 (ref 2–6.9)
NEUTROPHILS NFR BLD AUTO: 60.2 % (ref 37–80)
NRBC BLD AUTO-RTO: 0 /100 WBC (ref 0–0)
PLATELET # BLD AUTO: 233 10*3/MM3 (ref 130–400)
PMV BLD AUTO: 10.9 FL (ref 6–12)
RBC # BLD AUTO: 4.23 10*6/MM3 (ref 4.2–5.4)
WBC NRBC COR # BLD: 9.75 10*3/MM3 (ref 4.8–10.8)

## 2019-03-20 PROCEDURE — 86140 C-REACTIVE PROTEIN: CPT

## 2019-03-20 PROCEDURE — 86225 DNA ANTIBODY NATIVE: CPT

## 2019-03-20 PROCEDURE — 86430 RHEUMATOID FACTOR TEST QUAL: CPT

## 2019-03-20 PROCEDURE — 85025 COMPLETE CBC W/AUTO DIFF WBC: CPT

## 2019-03-20 PROCEDURE — 36415 COLL VENOUS BLD VENIPUNCTURE: CPT

## 2019-03-20 PROCEDURE — 99283 EMERGENCY DEPT VISIT LOW MDM: CPT

## 2019-03-20 PROCEDURE — 86235 NUCLEAR ANTIGEN ANTIBODY: CPT

## 2019-03-20 PROCEDURE — 85651 RBC SED RATE NONAUTOMATED: CPT

## 2019-03-20 RX ORDER — HYDROCODONE BITARTRATE AND ACETAMINOPHEN 5; 325 MG/1; MG/1
2 TABLET ORAL ONCE
Status: COMPLETED | OUTPATIENT
Start: 2019-03-20 | End: 2019-03-20

## 2019-03-20 RX ADMIN — HYDROCODONE BITARTRATE AND ACETAMINOPHEN 2 TABLET: 5; 325 TABLET ORAL at 21:37

## 2019-03-21 LAB
CENTROMERE B AB SER-ACNC: <0.2 AI (ref 0–0.9)
CHROMATIN AB SERPL-ACNC: <0.2 AI (ref 0–0.9)
DSDNA AB SER-ACNC: 2 IU/ML (ref 0–9)
ENA JO1 AB SER-ACNC: <0.2 AI (ref 0–0.9)
ENA RNP AB SER-ACNC: 0.2 AI (ref 0–0.9)
ENA SCL70 AB SER-ACNC: <0.2 AI (ref 0–0.9)
ENA SM AB SER-ACNC: <0.2 AI (ref 0–0.9)
ENA SS-A AB SER-ACNC: 2.8 AI (ref 0–0.9)
ENA SS-B AB SER-ACNC: <0.2 AI (ref 0–0.9)
Lab: ABNORMAL
RHEUMATOID FACT SERPL-ACNC: NEGATIVE [IU]/ML

## 2019-03-21 NOTE — ED PROVIDER NOTES
Subjective   58-year-old female presents emergency room with continued right knee pain.  She states that she knows she has a problem in the right thumb.  They just did an MRI at Dr. Dougherty's office.  There is a tear there.  They went ahead and injected it because the ortho does not want to do surgery at this time.  She states about 45 min after the injection  pain started to worsen greatly.  The physician order Tylenol with codeine and she went to pick that up and that has not helped either.  She presents today with no signs and symptoms of redness or swelling.  There is no signs and symptoms of infection.  She rates the pain a 10 out of 10.  She has not been using any type of immobilizer.  No injuries.  You can see where the injection site for the steroid was placed.  The patient does not have fever.  She denies any known injury.  She she denies any nausea, vomiting or diarrhea.  She has no other complaints at this time.  She has no changes in sensation.            Review of Systems   Constitutional: Positive for activity change (with the right thumb).   Gastrointestinal: Negative for diarrhea, nausea and vomiting.   Musculoskeletal: Positive for arthralgias (right thumb radiating into her right hand) and joint swelling (minimal thenar swelling). Negative for myalgias, neck pain and neck stiffness.   Skin: Negative for color change, rash and wound.   Neurological: Negative for weakness and numbness.   Hematological: Does not bruise/bleed easily.       Past Medical History:   Diagnosis Date   • Anxiety    • Arthritis    • Body piercing     BOTH EARS   • Constipation    • COPD (chronic obstructive pulmonary disease) (CMS/[facility])    • Depression    • Elevated cholesterol    • Esophagitis    • GERD (gastroesophageal reflux disease)    • History of bronchitis    • History of fracture     HISTORY OF LEFT WRIST AS A CHILD, RIGHT WRIST AUGUST 2018 AND REQUIRED SURGICAL INTERVENTION   • History of tattoo     X1   •  Hyperlipidemia    • Hypertension    • Seasonal allergies    • Wears dentures     FULL DENTURES - INSTRUCTED NO ADHESIVES THE DOS   • Wears glasses        Allergies   Allergen Reactions   • Sulfa Antibiotics Hives     ITCHING ,FLUSHING, SEVERE LOWER BACK PAIN       Past Surgical History:   Procedure Laterality Date   • COLONOSCOPY     • COLONOSCOPY N/A 4/11/2018    Procedure: COLONOSCOPY WITH SNARE POLYPECTOMY;  Surgeon: Tan Nolan MD;  Location: [facility] ENDOSCOPY;  Service: Gastroenterology   • MOUTH SURGERY      FULL EXTRACTION   • ORIF ULNA/RADIUS FRACTURES Right 9/5/2018    Procedure: WRIST OPEN REDUCTION INTERNAL FIXATION DISTAL RADIUS RIGHT (SKELETAL DYNAMICS-IRIS HUTTON);  Surgeon: Grant Dougherty MD;  Location: [facility] OR;  Service: Orthopedics   • ORIF WRIST FRACTURE Right 12/19/2018    Procedure: RIGHT THUMB EXTENSOR INDICUS PROPRIUS TO EXTENSOR POLLICUS LONGUS TENDON EXPLORATION;  Surgeon: Grant Dougherty MD;  Location: [facility] OR;  Service: Orthopedics   • UPPER GASTROINTESTINAL ENDOSCOPY         Family History   Problem Relation Age of Onset   • Cirrhosis Other        Social History     Socioeconomic History   • Marital status: Single     Spouse name: Not on file   • Number of children: Not on file   • Years of education: Not on file   • Highest education level: Not on file   Tobacco Use   • Smoking status: Current Some Day Smoker     Packs/day: 0.25     Years: 40.00     Pack years: 10.00     Types: Cigarettes   • Smokeless tobacco: Never Used   • Tobacco comment: REPORTS SHE HAS SMOKED UP TO 1 PPD IN THE PAST.    Substance and Sexual Activity   • Alcohol use: Yes     Comment: OCCASSIONAL, REPORTS NO HISTORY OF ABUSE   • Drug use: No   • Sexual activity: Defer           Objective   Physical Exam   Constitutional: She is oriented to person, place, and time. Vital signs are normal. She appears well-developed and well-nourished. She is cooperative.  Non-toxic appearance. She does not have a sickly  appearance. She does not appear ill. No distress.   HENT:   Head: Normocephalic and atraumatic.   Right Ear: Hearing and external ear normal.   Left Ear: Hearing and external ear normal.   Nose: Nose normal.   Neck: Normal range of motion. No neck rigidity.   Cardiovascular: Normal rate and regular rhythm.   Pulmonary/Chest: Effort normal. No respiratory distress.   Musculoskeletal:        Right hand: She exhibits decreased range of motion, tenderness, bony tenderness and swelling. Normal sensation noted. Decreased strength noted.   Neurological: She is alert and oriented to person, place, and time. She has normal strength. She is not disoriented. No sensory deficit. Gait normal. GCS eye subscore is 4. GCS verbal subscore is 5. GCS motor subscore is 6.   Decreased  in the right hand   Skin: Skin is warm and dry. Capillary refill takes less than 2 seconds. No ecchymosis and no lesion noted. No erythema.   Psychiatric: She has a normal mood and affect. Her speech is normal and behavior is normal. Judgment normal.   Nursing note and vitals reviewed.      Procedures           ED Course      8:50 PM-patient is going to be immobilized with a thumb spica splint and dose of pain medication here in the ER.  She has her pain medication that she has been previously prescribed and she will need to call her tomorrow.    I explained to her that x-ray is not can show us anything more than the MRI that she had recently did.  We only have x-ray.  We are not and I have any MRI tonight.  Imaging will not change my plan of immobilization and pain medication here.    I did advise to the patient that I would not be discharging home with any additional pain medications.  I think the immobilization will help immensely.    Red flags, indicating immediate need to return to the emergency room, were discussed with the patient and/or the patient's family and they verbalized understanding.  The patient and/or the family were encouraged to  return to the emergency room for any worsening or concerning symptoms.    The patient verbalized understanding and agreed with the plan.        MDM  Number of Diagnoses or Management Options  Pain in thumb joint with movement, right: established and worsening  Risk of Complications, Morbidity, and/or Mortality  Presenting problems: minimal  Diagnostic procedures: minimal  Management options: minimal  General comments: The patient has had several different surgeries on this right thumb.  The patient had an injection today.  Had increasing pain.  She has not had it immobilized.  I think immobilization will help.  There are no signs and symptoms of infection or compartment syndrome.      Patient Progress  Patient progress: stable        Final diagnoses:   Pain in thumb joint with movement, right            Alla Monzon PA-C  03/20/19 2117       Alla Monzon PA-C  03/20/19 2140

## 2020-06-27 ENCOUNTER — HOSPITAL ENCOUNTER (EMERGENCY)
Facility: HOSPITAL | Age: 60
Discharge: HOME | End: 2020-06-27
Attending: STUDENT IN AN ORGANIZED HEALTH CARE EDUCATION/TRAINING PROGRAM | Admitting: STUDENT IN AN ORGANIZED HEALTH CARE EDUCATION/TRAINING PROGRAM

## 2020-06-27 ENCOUNTER — APPOINTMENT (OUTPATIENT)
Dept: GENERAL RADIOLOGY | Facility: HOSPITAL | Age: 60
End: 2020-06-27

## 2020-06-27 VITALS
DIASTOLIC BLOOD PRESSURE: 113 MMHG | SYSTOLIC BLOOD PRESSURE: 197 MMHG | HEART RATE: 71 BPM | BODY MASS INDEX: 26.87 KG/M2 | WEIGHT: 167.2 LBS | TEMPERATURE: 98.5 F | OXYGEN SATURATION: 93 % | RESPIRATION RATE: 18 BRPM | HEIGHT: 66 IN

## 2020-06-27 DIAGNOSIS — M70.21 OLECRANON BURSITIS OF RIGHT ELBOW: Primary | ICD-10-CM

## 2020-06-27 PROCEDURE — 73080 X-RAY EXAM OF ELBOW: CPT

## 2020-06-27 PROCEDURE — 99282 EMERGENCY DEPT VISIT SF MDM: CPT

## 2020-06-27 RX ORDER — HYDROCODONE BITARTRATE AND ACETAMINOPHEN 5; 325 MG/1; MG/1
TABLET ORAL
Qty: 20 TABLET | Refills: 0 | Status: SHIPPED | OUTPATIENT
Start: 2020-06-27
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
## 2020-12-05 ENCOUNTER — APPOINTMENT (OUTPATIENT)
Dept: GENERAL RADIOLOGY | Facility: HOSPITAL | Age: 60
End: 2020-12-05

## 2020-12-05 ENCOUNTER — HOSPITAL ENCOUNTER (EMERGENCY)
Facility: HOSPITAL | Age: 60
Discharge: HOME | End: 2020-12-05
Attending: EMERGENCY MEDICINE | Admitting: EMERGENCY MEDICINE

## 2020-12-05 VITALS
SYSTOLIC BLOOD PRESSURE: 169 MMHG | RESPIRATION RATE: 20 BRPM | BODY MASS INDEX: 26.36 KG/M2 | HEIGHT: 66 IN | DIASTOLIC BLOOD PRESSURE: 73 MMHG | TEMPERATURE: 98.1 F | WEIGHT: 164 LBS | OXYGEN SATURATION: 96 % | HEART RATE: 75 BPM

## 2020-12-05 DIAGNOSIS — M54.50 BILATERAL LOW BACK PAIN, UNSPECIFIED CHRONICITY, UNSPECIFIED WHETHER SCIATICA PRESENT: Primary | ICD-10-CM

## 2020-12-05 DIAGNOSIS — M70.21 OLECRANON BURSITIS OF RIGHT ELBOW: ICD-10-CM

## 2020-12-05 LAB
BILIRUB UR QL STRIP: NEGATIVE
CLARITY UR: CLEAR
COLOR UR: YELLOW
GLUCOSE UR STRIP-MCNC: NEGATIVE MG/DL
HGB UR QL STRIP.AUTO: NEGATIVE
KETONES UR QL STRIP: NEGATIVE
LEUKOCYTE ESTERASE UR QL STRIP.AUTO: NEGATIVE
NITRITE UR QL STRIP: NEGATIVE
PH UR STRIP.AUTO: 6.5 [PH] (ref 5–8)
PROT UR QL STRIP: NEGATIVE
SP GR UR STRIP: 1.01 (ref 1–1.03)
UROBILINOGEN UR QL STRIP: NORMAL

## 2020-12-05 PROCEDURE — 99283 EMERGENCY DEPT VISIT LOW MDM: CPT

## 2020-12-05 PROCEDURE — 72100 X-RAY EXAM L-S SPINE 2/3 VWS: CPT

## 2020-12-05 PROCEDURE — 81003 URINALYSIS AUTO W/O SCOPE: CPT | Performed by: NURSE PRACTITIONER

## 2020-12-05 RX ORDER — HYDROCODONE BITARTRATE AND ACETAMINOPHEN 5; 325 MG/1; MG/1
1 TABLET ORAL ONCE
Status: COMPLETED | OUTPATIENT
Start: 2020-12-05 | End: 2020-12-05

## 2020-12-05 RX ORDER — METHOCARBAMOL 500 MG/1
500 TABLET, FILM COATED ORAL 3 TIMES DAILY
Qty: 21 TABLET | Refills: 0 | Status: SHIPPED | OUTPATIENT
Start: 2020-12-05

## 2020-12-05 RX ORDER — MELOXICAM 7.5 MG/1
7.5 TABLET ORAL DAILY
Qty: 14 TABLET | Refills: 0 | Status: SHIPPED | OUTPATIENT
Start: 2020-12-05

## 2020-12-05 RX ORDER — MELOXICAM 7.5 MG/1
7.5 TABLET ORAL DAILY
Status: DISCONTINUED | OUTPATIENT
Start: 2020-12-05 | End: 2020-12-05 | Stop reason: HOSPADM

## 2020-12-05 RX ORDER — METHOCARBAMOL 500 MG/1
500 TABLET, FILM COATED ORAL ONCE
Status: COMPLETED | OUTPATIENT
Start: 2020-12-05 | End: 2020-12-05

## 2020-12-05 RX ADMIN — METHOCARBAMOL 500 MG: 500 TABLET, FILM COATED ORAL at 17:14

## 2020-12-05 RX ADMIN — MELOXICAM 7.5 MG: 7.5 TABLET ORAL at 17:14

## 2020-12-05 RX ADMIN — HYDROCODONE BITARTRATE AND ACETAMINOPHEN 1 TABLET: 5; 325 TABLET ORAL at 17:14

## 2020-12-05 NOTE — ED PROVIDER NOTES
Subjective   History of Present Illness  This is a 60 year old female who comes in today complaining of lower back pain. She reports pain started this morning when she woke up. She denies any known injury. She reports it is hard to walk around.   Review of Systems   Constitutional: Negative.    HENT: Negative.    Eyes: Negative.    Respiratory: Negative.    Cardiovascular: Negative.    Gastrointestinal: Negative.    Endocrine: Negative.    Genitourinary: Negative.    Musculoskeletal: Positive for back pain.   Skin: Negative.    Allergic/Immunologic: Negative.    Neurological: Negative.    Hematological: Negative.    Psychiatric/Behavioral: Negative.        Past Medical History:   Diagnosis Date   • Anxiety    • Arthritis    • Body piercing     BOTH EARS   • Constipation    • COPD (chronic obstructive pulmonary disease) (CMS/HCC)    • Depression    • Elevated cholesterol    • Esophagitis    • GERD (gastroesophageal reflux disease)    • History of bronchitis    • History of fracture     HISTORY OF LEFT WRIST AS A CHILD, RIGHT WRIST AUGUST 2018 AND REQUIRED SURGICAL INTERVENTION   • History of tattoo     X1   • Hyperlipidemia    • Hypertension    • Seasonal allergies    • Wears dentures     FULL DENTURES - INSTRUCTED NO ADHESIVES THE DOS   • Wears glasses        Allergies   Allergen Reactions   • Sulfa Antibiotics Hives     ITCHING ,FLUSHING, SEVERE LOWER BACK PAIN       Past Surgical History:   Procedure Laterality Date   • COLONOSCOPY     • COLONOSCOPY N/A 4/11/2018    Procedure: COLONOSCOPY WITH SNARE POLYPECTOMY;  Surgeon: Tan Nolan MD;  Location: [facility] ENDOSCOPY;  Service: Gastroenterology   • MOUTH SURGERY      FULL EXTRACTION   • ORIF ULNA/RADIUS FRACTURES Right 9/5/2018    Procedure: WRIST OPEN REDUCTION INTERNAL FIXATION DISTAL RADIUS RIGHT (SKELETAL DYNAMICS-IRIS HUTTON);  Surgeon: Grant Dougherty MD;  Location: [facility] OR;  Service: Orthopedics   • ORIF WRIST FRACTURE Right 12/19/2018     Procedure: RIGHT THUMB EXTENSOR INDICUS PROPRIUS TO EXTENSOR POLLICUS LONGUS TENDON EXPLORATION;  Surgeon: Grant Dougherty MD;  Location: [facility];  Service: Orthopedics   • UPPER GASTROINTESTINAL ENDOSCOPY         Family History   Problem Relation Age of Onset   • Cirrhosis Other        Social History     Socioeconomic History   • Marital status: Single     Spouse name: Not on file   • Number of children: Not on file   • Years of education: Not on file   • Highest education level: Not on file   Tobacco Use   • Smoking status: Former Smoker     Packs/day: 0.25     Years: 40.00     Pack years: 10.00     Types: Cigarettes   • Smokeless tobacco: Never Used   • Tobacco comment: REPORTS SHE HAS SMOKED UP TO 1 PPD IN THE PAST.    Substance and Sexual Activity   • Alcohol use: Yes     Comment: OCCASSIONAL, REPORTS NO HISTORY OF ABUSE   • Drug use: No   • Sexual activity: Defer           Objective   Physical Exam  Vitals signs and nursing note reviewed.   Constitutional:       Appearance: Normal appearance. She is normal weight.   Neurological:      Mental Status: She is alert.     GEN: No acute distress  Head: Normocephalic, atraumatic  Eyes: Pupils equal round reactive to light  ENT: Posterior pharynx normal in appearance, oral mucosa is moist  Chest: Nontender to palpation  Cardiovascular: Regular rate  Lungs: Clear to auscultation bilaterally  Abdomen: Soft, nontender, nondistended, no peritoneal signs  Extremities: No edema, normal appearance  Lumbar: tender across lower back. Limited ROM due to pain.   Neuro: GCS 15  Psych: Mood and affect are appropriate      Procedures           ED Course  ED Course as of Dec 05 1732   Sat Dec 05, 2020   1730 I discussed the findings with the patient.  Of advised her to follow-up with her primary care in the next 24 to 48 hours.  Again her strict return to care instructions and she is agreeable to this plan of care.    [TW]      ED Course User Index  [TW] Urszula Marcum, APRN                                            MDM  Number of Diagnoses or Management Options     Amount and/or Complexity of Data Reviewed  Clinical lab tests: reviewed and ordered  Tests in the radiology section of CPT®: ordered and reviewed  Review and summarize past medical records: yes  Discuss the patient with other providers: yes  Independent visualization of images, tracings, or specimens: yes    Risk of Complications, Morbidity, and/or Mortality  Presenting problems: low  Diagnostic procedures: low  Management options: low        Final diagnoses:   Bilateral low back pain, unspecified chronicity, unspecified whether sciatica present            Urszula Marcum, APRN  12/05/20 173

## 2021-04-23 ENCOUNTER — APPOINTMENT (OUTPATIENT)
Dept: GENERAL RADIOLOGY | Facility: HOSPITAL | Age: 61
End: 2021-04-23

## 2021-04-23 ENCOUNTER — HOSPITAL ENCOUNTER (EMERGENCY)
Facility: HOSPITAL | Age: 61
Discharge: HOME | End: 2021-04-24
Attending: EMERGENCY MEDICINE | Admitting: EMERGENCY MEDICINE

## 2021-04-23 DIAGNOSIS — I10 ELEVATED BLOOD PRESSURE READING WITH DIAGNOSIS OF HYPERTENSION: ICD-10-CM

## 2021-04-23 DIAGNOSIS — I49.3 PVC (PREMATURE VENTRICULAR CONTRACTION): ICD-10-CM

## 2021-04-23 DIAGNOSIS — R07.9 CHEST PAIN, UNSPECIFIED TYPE: Primary | ICD-10-CM

## 2021-04-23 LAB
ALBUMIN SERPL-MCNC: 4.4 G/DL (ref 3.5–5.2)
ALBUMIN/GLOB SERPL: 1.4 G/DL
ALP SERPL-CCNC: 149 U/L (ref 39–117)
ALT SERPL W P-5'-P-CCNC: 22 U/L (ref 1–33)
ANION GAP SERPL CALCULATED.3IONS-SCNC: 11.4 MMOL/L (ref 5–15)
AST SERPL-CCNC: 39 U/L (ref 1–32)
BASOPHILS # BLD AUTO: 0.07 10*3/MM3 (ref 0–0.2)
BASOPHILS NFR BLD AUTO: 0.6 % (ref 0–1.5)
BILIRUB SERPL-MCNC: 0.4 MG/DL (ref 0–1.2)
BUN SERPL-MCNC: 12 MG/DL (ref 8–23)
BUN/CREAT SERPL: 19.4 (ref 7–25)
CALCIUM SPEC-SCNC: 9.7 MG/DL (ref 8.6–10.5)
CHLORIDE SERPL-SCNC: 88 MMOL/L (ref 98–107)
CO2 SERPL-SCNC: 24.6 MMOL/L (ref 22–29)
CREAT SERPL-MCNC: 0.62 MG/DL (ref 0.57–1)
DEPRECATED RDW RBC AUTO: 45.4 FL (ref 37–54)
EOSINOPHIL # BLD AUTO: 0.18 10*3/MM3 (ref 0–0.4)
EOSINOPHIL NFR BLD AUTO: 1.6 % (ref 0.3–6.2)
ERYTHROCYTE [DISTWIDTH] IN BLOOD BY AUTOMATED COUNT: 13.2 % (ref 12.3–15.4)
GFR SERPL CREATININE-BSD FRML MDRD: 98 ML/MIN/1.73
GLOBULIN UR ELPH-MCNC: 3.1 GM/DL
GLUCOSE SERPL-MCNC: 103 MG/DL (ref 65–99)
HCT VFR BLD AUTO: 43.4 % (ref 34–46.6)
HGB BLD-MCNC: 15.4 G/DL (ref 12–15.9)
HOLD SPECIMEN: NORMAL
IMM GRANULOCYTES # BLD AUTO: 0.09 10*3/MM3 (ref 0–0.05)
IMM GRANULOCYTES NFR BLD AUTO: 0.8 % (ref 0–0.5)
LYMPHOCYTES # BLD AUTO: 3.1 10*3/MM3 (ref 0.7–3.1)
LYMPHOCYTES NFR BLD AUTO: 26.7 % (ref 19.6–45.3)
MCH RBC QN AUTO: 33.5 PG (ref 26.6–33)
MCHC RBC AUTO-ENTMCNC: 35.5 G/DL (ref 31.5–35.7)
MCV RBC AUTO: 94.3 FL (ref 79–97)
MONOCYTES # BLD AUTO: 1.06 10*3/MM3 (ref 0.1–0.9)
MONOCYTES NFR BLD AUTO: 9.1 % (ref 5–12)
NEUTROPHILS NFR BLD AUTO: 61.2 % (ref 42.7–76)
NEUTROPHILS NFR BLD AUTO: 7.11 10*3/MM3 (ref 1.7–7)
NRBC BLD AUTO-RTO: 0 /100 WBC (ref 0–0.2)
PLATELET # BLD AUTO: 282 10*3/MM3 (ref 140–450)
PMV BLD AUTO: 9.9 FL (ref 6–12)
POTASSIUM SERPL-SCNC: 4 MMOL/L (ref 3.5–5.2)
PROT SERPL-MCNC: 7.5 G/DL (ref 6–8.5)
RBC # BLD AUTO: 4.6 10*6/MM3 (ref 3.77–5.28)
SODIUM SERPL-SCNC: 124 MMOL/L (ref 136–145)
TROPONIN T SERPL-MCNC: 0.02 NG/ML (ref 0–0.03)
TROPONIN T SERPL-MCNC: 0.03 NG/ML (ref 0–0.03)
WBC # BLD AUTO: 11.61 10*3/MM3 (ref 3.4–10.8)
WHOLE BLOOD HOLD SPECIMEN: NORMAL
WHOLE BLOOD HOLD SPECIMEN: NORMAL

## 2021-04-23 PROCEDURE — 99285 EMERGENCY DEPT VISIT HI MDM: CPT

## 2021-04-23 PROCEDURE — 71045 X-RAY EXAM CHEST 1 VIEW: CPT

## 2021-04-23 PROCEDURE — 85025 COMPLETE CBC W/AUTO DIFF WBC: CPT | Performed by: EMERGENCY MEDICINE

## 2021-04-23 PROCEDURE — 84484 ASSAY OF TROPONIN QUANT: CPT | Performed by: EMERGENCY MEDICINE

## 2021-04-23 PROCEDURE — 93005 ELECTROCARDIOGRAM TRACING: CPT | Performed by: EMERGENCY MEDICINE

## 2021-04-23 PROCEDURE — 80053 COMPREHEN METABOLIC PANEL: CPT | Performed by: EMERGENCY MEDICINE

## 2021-04-23 RX ORDER — LOSARTAN POTASSIUM 50 MG/1
50 TABLET ORAL ONCE
Status: COMPLETED | OUTPATIENT
Start: 2021-04-23 | End: 2021-04-23

## 2021-04-23 RX ORDER — METOPROLOL SUCCINATE 25 MG/1
50 TABLET, EXTENDED RELEASE ORAL ONCE
Status: COMPLETED | OUTPATIENT
Start: 2021-04-23 | End: 2021-04-23

## 2021-04-23 RX ORDER — SODIUM CHLORIDE 0.9 % (FLUSH) 0.9 %
10 SYRINGE (ML) INJECTION AS NEEDED
Status: DISCONTINUED | OUTPATIENT
Start: 2021-04-23 | End: 2021-04-24 | Stop reason: HOSPADM

## 2021-04-23 RX ADMIN — METOPROLOL SUCCINATE 50 MG: 25 TABLET, EXTENDED RELEASE ORAL at 21:53

## 2021-04-23 RX ADMIN — LOSARTAN POTASSIUM 50 MG: 50 TABLET, FILM COATED ORAL at 21:53

## 2021-04-24 VITALS
BODY MASS INDEX: 26.29 KG/M2 | TEMPERATURE: 98.1 F | HEART RATE: 73 BPM | HEIGHT: 66 IN | WEIGHT: 163.6 LBS | OXYGEN SATURATION: 94 % | SYSTOLIC BLOOD PRESSURE: 185 MMHG | DIASTOLIC BLOOD PRESSURE: 91 MMHG | RESPIRATION RATE: 18 BRPM

## 2021-04-24 RX ORDER — CLONIDINE HYDROCHLORIDE 0.1 MG/1
0.1 TABLET ORAL 2 TIMES DAILY
Qty: 30 TABLET | Refills: 0 | Status: SHIPPED | OUTPATIENT
Start: 2021-04-24

## 2021-04-29 NOTE — ED PROVIDER NOTES
Subjective   History of Present Illness    Chief Complaint: Chest tightness  History of Present Illness: 59-year-old female history of COPD presents with above complaint x1 day.  Described as tightness nonradiating  Onset: 1 day  Duration: 1 day  Exacerbating / Alleviating factors: None  Associated symptoms: None      Nurses Notes reviewed and agree, including vitals, allergies, social history and prior medical history.     REVIEW OF SYSTEMS: All systems reviewed and not pertinent unless noted.    Positive for: Chest tightness    Negative for: Fever cough hemoptysis syncope palpitations abdominal pain back pain urinary symptoms  Review of Systems    Past Medical History:   Diagnosis Date   • Anxiety    • Arthritis    • Body piercing     BOTH EARS   • Constipation    • COPD (chronic obstructive pulmonary disease) (CMS/[facility])    • Depression    • Elevated cholesterol    • Esophagitis    • GERD (gastroesophageal reflux disease)    • History of bronchitis    • History of fracture     HISTORY OF LEFT WRIST AS A CHILD, RIGHT WRIST AUGUST 2018 AND REQUIRED SURGICAL INTERVENTION   • History of tattoo     X1   • Hyperlipidemia    • Hypertension    • Seasonal allergies    • Wears dentures     FULL DENTURES - INSTRUCTED NO ADHESIVES THE DOS   • Wears glasses        Allergies   Allergen Reactions   • Sulfa Antibiotics Hives     ITCHING ,FLUSHING, SEVERE LOWER BACK PAIN       Past Surgical History:   Procedure Laterality Date   • COLONOSCOPY     • COLONOSCOPY N/A 4/11/2018    Procedure: COLONOSCOPY WITH SNARE POLYPECTOMY;  Surgeon: Tan Nolan MD;  Location: [facility] ENDOSCOPY;  Service: Gastroenterology   • MOUTH SURGERY      FULL EXTRACTION   • ORIF ULNA/RADIUS FRACTURES Right 9/5/2018    Procedure: WRIST OPEN REDUCTION INTERNAL FIXATION DISTAL RADIUS RIGHT (SKELETAL DYNAMICS-IRIS HUTTON);  Surgeon: Grant Dougherty MD;  Location: [facility] OR;  Service: Orthopedics   • ORIF WRIST FRACTURE Right 12/19/2018    Procedure: RIGHT  THUMB EXTENSOR INDICUS PROPRIUS TO EXTENSOR POLLICUS LONGUS TENDON EXPLORATION;  Surgeon: Grant Dougherty MD;  Location: [facility] OR;  Service: Orthopedics   • UPPER GASTROINTESTINAL ENDOSCOPY         Family History   Problem Relation Age of Onset   • Cirrhosis Other        Social History     Socioeconomic History   • Marital status: Single     Spouse name: Not on file   • Number of children: Not on file   • Years of education: Not on file   • Highest education level: Not on file   Tobacco Use   • Smoking status: Former Smoker     Packs/day: 0.25     Years: 40.00     Pack years: 10.00     Types: Cigarettes   • Smokeless tobacco: Never Used   • Tobacco comment: REPORTS SHE HAS SMOKED UP TO 1 PPD IN THE PAST.    Vaping Use   • Vaping Use: Never used   Substance and Sexual Activity   • Alcohol use: Yes     Comment: OCCASSIONAL, REPORTS NO HISTORY OF ABUSE   • Drug use: No   • Sexual activity: Defer           Objective   Physical Exam    CONSTITUTIONAL: Well developed, nontoxic 61-year-old white female,  in no acute distress.  VITAL SIGNS: per nursing, reviewed and noted  SKIN: exposed skin with no rashes, ulcerations or petechiae.  EYES: perrla. EOMI.  ENT: Normal voice.  Patient maintained wearing a mask throughout patient encounter due to coronavirus pandemic  RESPIRATORY:  No increased work of breathing. No retractions.   CARDIOVASCULAR:  regular rate and rhythm, no murmurs.  Good Peripheral pulses. Good cap refill to extremities.   GI: Abdomen soft, nontender, normal bowel sounds. No hernia. No ascites.  MUSCULOSKELETAL:  No tenderness. Full ROM. Strength and tone grossly normal.  no spasms. no neck or back tenderness or spasm.   NEUROLOGIC: Alert, oriented x 3. No gross deficits. GCS 15.   PSYCH: appropriate affect.  : no bladder tenderness or distention, no CVA tenderness      Procedures     No attending physician procedures were performed on this patient.      ED Course  ED Course as of Apr 29 0251 Fri Apr  23, 2021 2134 EKG interpreted by me reveals sinus rhythm rate 94.  Occasional PVCs.  No ischemic changes.    [PF]   u Apr 29, 2021   0250 Troponin T: 0.018 [PF]   0250 Glucose(!): 103 [PF]   0250 Sodium(!): 124 [PF]   0250 Chloride(!): 88 [PF]   0250 AST (SGOT)(!): 39 [PF]   0250 Alkaline Phosphatase(!): 149 [PF]   0250 WBC(!): 11.61 [PF]   0250 HISTORY: Chest Pain Triage Protocol     COMPARISON: 12/12/2018.     FINDINGS: The heart is normal in size. The mediastinum is unremarkable.  Streak-like densities at the left base may be secondary to atelectasis  versus infiltrate. There is no pneumothorax.  There are no acute osseous  abnormalities.     IMPRESSION:  Streak-like densities at the left base may be secondary to  atelectasis versus infiltrate.     Continued followup is recommended.     This report was finalized on 4/24/2021 7:22 AM by Juan Jose Ferreira DO.    [PF]      ED Course User Index  [PF] Koko Yip, DO                                           [facility]  59-year-old female presented with chest pain.  Patient had a low blood pressure reading but not had her daily medications.  Dosed with those here.  Will discharge with as needed clonidine.  Recommend blood pressure diary.  Supportive care outpatient follow return precaution discussed.  Clinically no findings for pneumonia.  No indications for antibiotics.  Of note patient had PVCs on monitor, a nonpathologic finding.  Final diagnoses:   Chest pain, unspecified type   Elevated blood pressure reading with diagnosis of hypertension   PVC (premature ventricular contraction)       ED Disposition  ED Disposition     ED Disposition Condition Comment    Discharge Stable           Zarina Craig, APRN  [address] [place] [address] [phone]          [facility] Emergency Department  [address] [address] [phone]    As needed, If symptoms worsen         Medication List      New Prescriptions    cloNIDine  0.1 MG tablet  Commonly known as: CATAPRES  Take 1 tablet by mouth 2 (Two) Times a Day. As needed for bp greater than 160           Where to Get Your Medications      These medications were sent to [facility] #[id number] - [place], [place] - [address] - [phone]  - [phone] FX  [address] [address]    Phone: [phone]   · cloNIDine 0.1 MG tablet          Koko Yip DO  04/29/21 0251

## 2021-06-22 ENCOUNTER — TRANSCRIBE ORDERS (OUTPATIENT)
Dept: ADMINISTRATIVE | Facility: HOSPITAL | Age: 61
End: 2021-06-22

## 2021-06-22 DIAGNOSIS — R06.02 SOB (SHORTNESS OF BREATH): Primary | ICD-10-CM

## 2021-11-17 ENCOUNTER — APPOINTMENT (OUTPATIENT)
Dept: GENERAL RADIOLOGY | Facility: HOSPITAL | Age: 61
End: 2021-11-17

## 2021-11-17 ENCOUNTER — HOSPITAL ENCOUNTER (EMERGENCY)
Facility: HOSPITAL | Age: 61
Discharge: HOME | End: 2021-11-17
Attending: EMERGENCY MEDICINE | Admitting: EMERGENCY MEDICINE

## 2021-11-17 VITALS
OXYGEN SATURATION: 97 % | TEMPERATURE: 99.3 F | DIASTOLIC BLOOD PRESSURE: 79 MMHG | RESPIRATION RATE: 16 BRPM | BODY MASS INDEX: 23.46 KG/M2 | WEIGHT: 146 LBS | HEIGHT: 66 IN | HEART RATE: 80 BPM | SYSTOLIC BLOOD PRESSURE: 168 MMHG

## 2021-11-17 DIAGNOSIS — S20.212A RIB CONTUSION, LEFT, INITIAL ENCOUNTER: Primary | ICD-10-CM

## 2021-11-17 PROCEDURE — 96372 THER/PROPH/DIAG INJ SC/IM: CPT

## 2021-11-17 PROCEDURE — 99283 EMERGENCY DEPT VISIT LOW MDM: CPT

## 2021-11-17 PROCEDURE — 71101 X-RAY EXAM UNILAT RIBS/CHEST: CPT

## 2021-11-17 PROCEDURE — 25010000002 KETOROLAC TROMETHAMINE PER 15 MG: Performed by: PHYSICIAN ASSISTANT

## 2021-11-17 RX ORDER — CYCLOBENZAPRINE HCL 5 MG
5 TABLET ORAL 3 TIMES DAILY PRN
Qty: 12 TABLET | Refills: 0 | Status: SHIPPED | OUTPATIENT
Start: 2021-11-17

## 2021-11-17 RX ORDER — KETOROLAC TROMETHAMINE 30 MG/ML
30 INJECTION, SOLUTION INTRAMUSCULAR; INTRAVENOUS EVERY 6 HOURS PRN
Status: DISCONTINUED | OUTPATIENT
Start: 2021-11-17 | End: 2021-11-18 | Stop reason: HOSPADM

## 2021-11-17 RX ORDER — CYCLOBENZAPRINE HCL 10 MG
10 TABLET ORAL ONCE
Status: COMPLETED | OUTPATIENT
Start: 2021-11-17 | End: 2021-11-17

## 2021-11-17 RX ORDER — ACETAMINOPHEN 500 MG
1000 TABLET ORAL ONCE
Status: COMPLETED | OUTPATIENT
Start: 2021-11-17 | End: 2021-11-17

## 2021-11-17 RX ADMIN — CYCLOBENZAPRINE 10 MG: 10 TABLET, FILM COATED ORAL at 21:09

## 2021-11-17 RX ADMIN — KETOROLAC TROMETHAMINE 30 MG: 30 INJECTION, SOLUTION INTRAMUSCULAR; INTRAVENOUS at 21:11

## 2021-11-17 RX ADMIN — ACETAMINOPHEN 1000 MG: 500 TABLET ORAL at 21:10

## 2021-11-18 NOTE — ED NOTES
Pt received discharge instructions and verbalized understanding; Breathing even and non labored with no signs of distress; AOx4; GCS 15; Pt ambulated off unit with steady gait to meet friend/ride.      Maria A Bethea RN  11/17/21 3723

## 2021-11-18 NOTE — ED PROVIDER NOTES
Subjective   History of Present Illness   Patient is a 61-year-old female presenting to the ER with complaints of left-sided chest wall pain secondary to chiropractor adjustment days ago.  She states that the chiropractor got heavy handed.  She states that she was laying on her stomach due to shortness of breath secondary to COPD.  She states he usually manipulates her while she is laying on her back.  She states that since then she has had rib pain.  She states that she is always short of breath due to COPD but has not had any increased shortness of breath since the incident occurred.  She denies any treatments prior to arrival.    Review of Systems   Musculoskeletal:        Left-sided rib pain   All other systems reviewed and are negative.      Past Medical History:   Diagnosis Date   • Anxiety    • Arthritis    • Body piercing     BOTH EARS   • Constipation    • COPD (chronic obstructive pulmonary disease) (HCC)    • Depression    • Elevated cholesterol    • Esophagitis    • GERD (gastroesophageal reflux disease)    • History of bronchitis    • History of fracture     HISTORY OF LEFT WRIST AS A CHILD, RIGHT WRIST AUGUST 2018 AND REQUIRED SURGICAL INTERVENTION   • History of tattoo     X1   • Hyperlipidemia    • Hypertension    • Seasonal allergies    • Wears dentures     FULL DENTURES - INSTRUCTED NO ADHESIVES THE DOS   • Wears glasses        Allergies   Allergen Reactions   • Sulfa Antibiotics Hives     ITCHING ,FLUSHING, SEVERE LOWER BACK PAIN       Past Surgical History:   Procedure Laterality Date   • COLONOSCOPY     • COLONOSCOPY N/A 4/11/2018    Procedure: COLONOSCOPY WITH SNARE POLYPECTOMY;  Surgeon: Tan Nolan MD;  Location: [facility] ENDOSCOPY;  Service: Gastroenterology   • MOUTH SURGERY      FULL EXTRACTION   • ORIF ULNA/RADIUS FRACTURES Right 9/5/2018    Procedure: WRIST OPEN REDUCTION INTERNAL FIXATION DISTAL RADIUS RIGHT (SKELETAL DYNAMICS-IRIS HUTTON);  Surgeon: Grant Dougherty MD;   Location: [facility] OR;  Service: Orthopedics   • ORIF WRIST FRACTURE Right 12/19/2018    Procedure: RIGHT THUMB EXTENSOR INDICUS PROPRIUS TO EXTENSOR POLLICUS LONGUS TENDON EXPLORATION;  Surgeon: Grant Dougherty MD;  Location: [facility] OR;  Service: Orthopedics   • UPPER GASTROINTESTINAL ENDOSCOPY         Family History   Problem Relation Age of Onset   • Cirrhosis Other        Social History     Socioeconomic History   • Marital status: Single   Tobacco Use   • Smoking status: Former Smoker     Packs/day: 0.25     Years: 40.00     Pack years: 10.00     Types: Cigarettes   • Smokeless tobacco: Never Used   • Tobacco comment: REPORTS SHE HAS SMOKED UP TO 1 PPD IN THE PAST.    Vaping Use   • Vaping Use: Never used   Substance and Sexual Activity   • Alcohol use: Yes     Comment: OCCASSIONAL, REPORTS NO HISTORY OF ABUSE   • Drug use: No   • Sexual activity: Defer           Objective   Physical Exam  Vitals and nursing note reviewed.   Constitutional:       General: She is not in acute distress.     Appearance: She is not toxic-appearing.   HENT:      Head: Normocephalic and atraumatic.      Right Ear: External ear normal.      Left Ear: External ear normal.      Nose: Nose normal.   Eyes:      Extraocular Movements: Extraocular movements intact.      Conjunctiva/sclera: Conjunctivae normal.   Cardiovascular:      Rate and Rhythm: Normal rate.      Heart sounds: Normal heart sounds.   Pulmonary:      Effort: Pulmonary effort is normal. No respiratory distress.      Breath sounds: Normal breath sounds.      Comments: Patient wearing nasal cannula, breathing comfortably  Abdominal:      General: Bowel sounds are normal. There is no distension.      Palpations: Abdomen is soft.      Tenderness: There is no abdominal tenderness.   Musculoskeletal:         General: Normal range of motion.      Cervical back: Normal range of motion and neck supple.      Comments: Tenderness to left chest wall/ribs   Skin:     General: Skin is  warm and dry.   Neurological:      General: No focal deficit present.      Mental Status: She is alert and oriented to person, place, and time.   Psychiatric:         Mood and Affect: Mood normal.         Behavior: Behavior normal.         Procedures           ED Course                                           MDM   Patient was evaluated in the ER for left-sided rib pain secondary to manipulation by her chiropractor.  She was given Flexeril, Tylenol, and Toradol in the ER.  X-rays were performed and reviewed by myself and ED attending.  No obvious fracture identified.  Patient was given an incentive spirometer.  She was given a prescription for Flexeril.  She was advised to follow-up with her PCP as needed.  Precautions were given for return to the ER for any new or worsening symptoms.    Final diagnoses:   Rib contusion, left, initial encounter       ED Disposition  ED Disposition     ED Disposition Condition Comment    Discharge Stable           Zarina Craig, THEODORE  [address] [place] [address]   [facility] [address] [phone]    Schedule an appointment as soon as possible for a visit   for re-evaluation of today's complaint    [facility] Emergency Department  [address] [address] [phone]  Go to   As needed, If symptoms worsen         Medication List      New Prescriptions    cyclobenzaprine 5 MG tablet  Commonly known as: FLEXERIL  Take 1 tablet by mouth 3 (Three) Times a Day As Needed for Muscle Spasms.           Where to Get Your Medications      These medications were sent to [facility]/pharmacy #[id number] - [place], [place] - [address] - [phone]  - [phone] FX  [address] [address]    Phone: [phone]   · cyclobenzaprine 5 MG tablet          Rosetta Madera PA-C  11/17/21 7140

## 2021-11-30 ENCOUNTER — TRANSCRIBE ORDERS (OUTPATIENT)
Dept: ADMINISTRATIVE | Facility: HOSPITAL | Age: 61
End: 2021-11-30

## 2021-11-30 DIAGNOSIS — Z12.31 SCREENING MAMMOGRAM FOR BREAST CANCER: Primary | ICD-10-CM

## 2023-04-04 ENCOUNTER — TELEPHONE (OUTPATIENT)
Dept: SURGERY | Facility: CLINIC | Age: 63
End: 2023-04-04
Payer: COMMERCIAL

## 2023-04-14 NOTE — TELEPHONE ENCOUNTER
PRESCREENING FOR OPEN ACCESS SCHEDULING    Katelynn Ribeiro, 1960  [id number]    04/14/23    If, the patient answers yes to any of the following questions the provider will be informed prior to scheduling open access for approval and documented in the chart.    [x]  Yes  [] No    1. Have you ever had a colonoscopy in the past?      When:5yrs        Where: [facility]      Polyps or other: Yes     []  Yes  [x] No    2. Family history of colon cancer?      Relation:       Age of onset:       Do you currently have any of the following?    []  Yes  [x] No  Rectal bleeding, if so, how long?     []  Yes  [x] No  Abdominal pain, if so, how long?    [x]  Yes  [] No  Constipation, if so, how long?    []  Yes  [x] No  Diarrhea, if so, how long?    []  Yes  [x] No  Weight loss, is so, how much?    [] Yes  [x] No  Small caliber stool, if so, how long?      Have you ever had any of the following conditions?    [] Yes  [x] No  Heart attack?      When?       Last cardiac workup?     Blood thinners?    [x] Yes  [] No   Lung problems, asthma or COPD?  [] Yes  [x] No  Oxygen required?       [] Yes  [x] No  Stroke?     [] Yes  [x] No  Have you ever had a reaction to anesthesia?

## 2023-04-25 NOTE — PROGRESS NOTES
Patient: Katelynn Ribeiro    YOB: 1960    Date: 04/27/2023    Primary Care Provider: Zarina Craig APRN    Chief Complaint   Patient presents with   • Constipation       SUBJECTIVE:    History of present illness: Patient states that for approximately 9 months patient has had issues with constipation.  She can go up to 4 days without a bowel movement if she does not take something.  She usually takes Dulcolax which helps.  She does not take Dulcolax daily however.  No abdominal pain    The following portions of the patient's history were reviewed and updated as appropriate: allergies, current medications, past family history, past medical history, past social history, past surgical history and problem list.      Review of Systems    Allergies:  Allergies   Allergen Reactions   • Sulfa Antibiotics Hives and Other (See Comments)     ITCHING ,FLUSHING, SEVERE LOWER BACK PAIN  Flushing, kidney pain       Medications:    Current Outpatient Medications:   •  atorvastatin (LIPITOR) 20 MG tablet, Take 1 tablet by mouth Daily., Disp: , Rfl:   •  losartan (COZAAR) 25 MG tablet, Take 2 tablets by mouth 2 (Two) Times a Day., Disp: , Rfl:   •  metoprolol succinate XL (TOPROL-XL) 50 MG 24 hr tablet, Take 1 tablet by mouth Daily., Disp: , Rfl: 11  •  Multiple Vitamin (MULTI-VITAMIN DAILY PO), Take 1 tablet by mouth Daily., Disp: , Rfl:   •  NIFEdipine XL (PROCARDIA XL) 30 MG 24 hr tablet, Take 1 tablet by mouth Daily., Disp: , Rfl:   •  pantoprazole (PROTONIX) 20 MG EC tablet, Take 1 tablet by mouth Daily., Disp: , Rfl:   •  sertraline (ZOLOFT) 50 MG tablet, Take 2 tablets by mouth 2 (Two) Times a Day., Disp: , Rfl:   •  traZODone (DESYREL) 100 MG tablet, Take 1.5 tablets by mouth Every Night., Disp: , Rfl:   •  aspirin 81 MG chewable tablet, Chew 81 mg Daily., Disp: , Rfl:     History:  Past Medical History:   Diagnosis Date   • Anxiety    • Arthritis    • Body piercing     BOTH EARS   • Constipation   "  • COPD (chronic obstructive pulmonary disease)    • Depression    • Elevated cholesterol    • Esophagitis    • GERD (gastroesophageal reflux disease)    • History of bronchitis    • History of fracture     HISTORY OF LEFT WRIST AS A CHILD, RIGHT WRIST AUGUST 2018 AND REQUIRED SURGICAL INTERVENTION   • History of tattoo     X1   • Hyperlipidemia    • Hypertension    • Seasonal allergies    • Wears dentures     FULL DENTURES - INSTRUCTED NO ADHESIVES THE DOS   • Wears glasses        Past Surgical History:   Procedure Laterality Date   • COLONOSCOPY     • COLONOSCOPY N/A 4/11/2018    Procedure: COLONOSCOPY WITH SNARE POLYPECTOMY;  Surgeon: Tan Nolan MD;  Location: [facility] ENDOSCOPY;  Service: Gastroenterology   • MOUTH SURGERY      FULL EXTRACTION   • ORIF ULNA/RADIUS FRACTURES Right 9/5/2018    Procedure: WRIST OPEN REDUCTION INTERNAL FIXATION DISTAL RADIUS RIGHT (SKELETAL DYNAMICS-IRIS HUTTON);  Surgeon: Grant Dougherty MD;  Location: [facility] OR;  Service: Orthopedics   • ORIF WRIST FRACTURE Right 12/19/2018    Procedure: RIGHT THUMB EXTENSOR INDICUS PROPRIUS TO EXTENSOR POLLICUS LONGUS TENDON EXPLORATION;  Surgeon: Grant Dougherty MD;  Location: [facility] OR;  Service: Orthopedics   • UPPER GASTROINTESTINAL ENDOSCOPY         Family History   Problem Relation Age of Onset   • Cirrhosis Other        Social History     Tobacco Use   • Smoking status: Former     Packs/day: 0.25     Years: 40.00     Pack years: 10.00     Types: Cigarettes   • Smokeless tobacco: Never   • Tobacco comments:     REPORTS SHE HAS SMOKED UP TO 1 PPD IN THE PAST.    Vaping Use   • Vaping Use: Never used   Substance Use Topics   • Alcohol use: Yes     Comment: OCCASSIONAL, REPORTS NO HISTORY OF ABUSE   • Drug use: No        OBJECTIVE:    Vital Signs:   Vitals:    04/27/23 1341   BP: 128/66   Pulse: 72   Resp: 16   Temp: 97.7 °F (36.5 °C)   TempSrc: Temporal   SpO2: 95%   Weight: 61.1 kg (134 lb 12.8 oz)   Height: 167.6 cm (66\") "       Physical Exam:   General Appearance:    Alert, cooperative, in no acute distress   Head:    Normocephalic, without obvious abnormality, atraumatic   Eyes:            Normal.  No scleral icterus.  PERRLA    Lungs:     Clear to auscultation,respirations regular, even and                  unlabored    Heart:    Regular rhythm and normal rate, normal S1 and S2, no            murmur   Abdomen:     Normal bowel sounds, no masses, no organomegaly, soft        non-tender, non-distended, no guarding,    Extremities:   Moves all extremities well, no edema, no cyanosis, no             redness   Skin:   No bleeding, bruising or rash   Neurologic:   Normal without gross deficits.   Psychiatric: No evidence of depression or anxiety          Results Review:   None    Review of Systems was reviewed and confirmed as accurate as documented by the MA.    ASSESSMENT/PLAN:    1. Encounter for colonoscopy due to history of adenomatous colonic polyps    2. Chronic idiopathic constipation        For the constipation I recommend high-fiber diet with a fiber supplement of choice or MiraLAX daily.  Dulcolax may be used as well especially since it is helping.  Follow-up as needed.    Also recommend a screening colonoscopy since it has been 5 years since her last colonoscopy.  I explained the procedure to the patient as well as the risks of bleeding and perforation and they understand the ramifications of these potential complications and they wish to proceed.        Electronically signed by Tan Nolan MD  04/27/23

## 2023-04-27 ENCOUNTER — OFFICE VISIT (OUTPATIENT)
Dept: SURGERY | Facility: CLINIC | Age: 63
End: 2023-04-27
Payer: COMMERCIAL

## 2023-04-27 VITALS
TEMPERATURE: 97.7 F | BODY MASS INDEX: 21.66 KG/M2 | SYSTOLIC BLOOD PRESSURE: 128 MMHG | HEART RATE: 72 BPM | WEIGHT: 134.8 LBS | HEIGHT: 66 IN | OXYGEN SATURATION: 95 % | RESPIRATION RATE: 16 BRPM | DIASTOLIC BLOOD PRESSURE: 66 MMHG

## 2023-04-27 DIAGNOSIS — K59.04 CHRONIC IDIOPATHIC CONSTIPATION: ICD-10-CM

## 2023-04-27 DIAGNOSIS — Z86.010 ENCOUNTER FOR COLONOSCOPY DUE TO HISTORY OF ADENOMATOUS COLONIC POLYPS: Primary | ICD-10-CM

## 2023-04-27 DIAGNOSIS — Z12.11 ENCOUNTER FOR COLONOSCOPY DUE TO HISTORY OF ADENOMATOUS COLONIC POLYPS: Primary | ICD-10-CM

## 2023-04-27 RX ORDER — NIFEDIPINE 30 MG/1
30 TABLET, EXTENDED RELEASE ORAL DAILY
COMMUNITY

## 2023-04-27 RX ORDER — POLYETHYLENE GLYCOL 3350 17 G/17G
238 POWDER, FOR SOLUTION ORAL ONCE
Qty: 17 PACKET | Refills: 0 | Status: SHIPPED | OUTPATIENT
Start: 2023-04-27 | End: 2023-04-27

## 2023-04-27 RX ORDER — BISACODYL 5 MG/1
5 TABLET, DELAYED RELEASE ORAL TAKE AS DIRECTED
Qty: 4 TABLET | Refills: 0 | Status: SHIPPED | OUTPATIENT
Start: 2023-04-27 | End: 2024-04-26

## 2023-05-05 ENCOUNTER — OFFICE VISIT (OUTPATIENT)
Dept: PULMONOLOGY | Facility: CLINIC | Age: 63
End: 2023-05-05
Payer: COMMERCIAL

## 2023-05-05 ENCOUNTER — PATIENT ROUNDING (BHMG ONLY) (OUTPATIENT)
Dept: PULMONOLOGY | Facility: CLINIC | Age: 63
End: 2023-05-05
Payer: COMMERCIAL

## 2023-05-05 VITALS
SYSTOLIC BLOOD PRESSURE: 110 MMHG | DIASTOLIC BLOOD PRESSURE: 60 MMHG | WEIGHT: 137.8 LBS | HEART RATE: 75 BPM | RESPIRATION RATE: 18 BRPM | OXYGEN SATURATION: 95 % | HEIGHT: 66 IN | BODY MASS INDEX: 22.14 KG/M2

## 2023-05-05 DIAGNOSIS — K21.9 GASTROESOPHAGEAL REFLUX DISEASE WITHOUT ESOPHAGITIS: ICD-10-CM

## 2023-05-05 DIAGNOSIS — J43.2 CENTRILOBULAR EMPHYSEMA: Primary | ICD-10-CM

## 2023-05-05 DIAGNOSIS — Q39.4 ESOPHAGEAL WEB: ICD-10-CM

## 2023-05-05 DIAGNOSIS — F17.210 NICOTINE DEPENDENCE, CIGARETTES, UNCOMPLICATED: ICD-10-CM

## 2023-05-05 PROBLEM — R12 HEARTBURN: Status: ACTIVE | Noted: 2023-05-05

## 2023-05-05 PROBLEM — J43.9 COPD (CHRONIC OBSTRUCTIVE PULMONARY DISEASE) WITH EMPHYSEMA: Status: ACTIVE | Noted: 2022-03-29

## 2023-05-05 NOTE — PROGRESS NOTES
New Pulmonary Patient Office Visit      Patient Name: Katelynn Ribeiro    Referring Physician: Zarina Craig APRN    Chief Complaint:    Chief Complaint   Patient presents with   • Breathing Problem   • Consult       History of Present Illness: Katelynn Ribeiro is a 63 y.o. female who is here today to establish care with Pulmonary.      Patient with past medical history of bipolar disorder, COPD requiring 2 L oxygen at home who was admitted in September 2022 [facility] secondary to COPD exacerbation and required antibiotics with azithromycin and ceftriaxone and nebulizer treatments.  During that hospitalization, she also had difficulty with swallowing and there was a concern for an esophageal web, but she has not followed up with GI.  She has seen Dr. Nolan and has colonoscopy scheduled later this month.     COPD diagnosed about 6 years ago.  Currently on Stiolto daily, but still requires albuterol 3-4 times per day.  She does have chronic cough with mostly clear mucus.  No hemoptysis.  She does have intermittent wheezing, but her symptoms do improve with rest.  She denies frequent exacerbations.  Has not been hospitalized other than the episode in September 2022.  Has not required prednisone or antibiotics since that time.     She had CT scan in September 2022, but does not have low-dose follow-up scheduled.   She cannot recall her last PFTs.    DME is [employer]    Subjective      Review of Systems:   Review of Systems   Respiratory: Positive for cough, shortness of breath and wheezing.    Gastrointestinal: Positive for GERD.   Psychiatric/Behavioral: The patient is nervous/anxious.        Past Medical History:   Past Medical History:   Diagnosis Date   • Anxiety    • Arthritis    • Body piercing     BOTH EARS   • Constipation    • COPD (chronic obstructive pulmonary disease)    • Depression    • Elevated cholesterol    • Esophagitis    • GERD (gastroesophageal reflux disease)     • History of bronchitis    • History of fracture     HISTORY OF LEFT WRIST AS A CHILD, RIGHT WRIST AUGUST 2018 AND REQUIRED SURGICAL INTERVENTION   • History of tattoo     X1   • Hyperlipidemia    • Hypertension    • Seasonal allergies    • Wears dentures     FULL DENTURES - INSTRUCTED NO ADHESIVES THE DOS   • Wears glasses        Past Surgical History:   Past Surgical History:   Procedure Laterality Date   • COLONOSCOPY     • COLONOSCOPY N/A 4/11/2018    Procedure: COLONOSCOPY WITH SNARE POLYPECTOMY;  Surgeon: Tan Nolan MD;  Location: [facility] ENDOSCOPY;  Service: Gastroenterology   • MOUTH SURGERY      FULL EXTRACTION   • ORIF ULNA/RADIUS FRACTURES Right 9/5/2018    Procedure: WRIST OPEN REDUCTION INTERNAL FIXATION DISTAL RADIUS RIGHT (SKELETAL DYNAMICS-IRIS HUTTON);  Surgeon: Grant Dougherty MD;  Location: [facility] OR;  Service: Orthopedics   • ORIF WRIST FRACTURE Right 12/19/2018    Procedure: RIGHT THUMB EXTENSOR INDICUS PROPRIUS TO EXTENSOR POLLICUS LONGUS TENDON EXPLORATION;  Surgeon: Grant Dougherty MD;  Location: [facility] OR;  Service: Orthopedics   • UPPER GASTROINTESTINAL ENDOSCOPY         Family History:   Family History   Problem Relation Age of Onset   • Cirrhosis Other        Social History:   Social History     Socioeconomic History   • Marital status: Single   Tobacco Use   • Smoking status: Former     Packs/day: 0.25     Years: 40.00     Pack years: 10.00     Types: Cigarettes   • Smokeless tobacco: Never   • Tobacco comments:     REPORTS SHE HAS SMOKED UP TO 1 PPD IN THE PAST.    Vaping Use   • Vaping Use: Never used   Substance and Sexual Activity   • Alcohol use: Yes     Comment: OCCASSIONAL, REPORTS NO HISTORY OF ABUSE   • Drug use: No   • Sexual activity: Defer       Medications:     Current Outpatient Medications:   •  aspirin 81 MG chewable tablet, Chew 1 tablet Daily., Disp: , Rfl:   •  atorvastatin (LIPITOR) 20 MG tablet, Take 1 tablet by mouth Daily., Disp: , Rfl:   •  bisacodyl  "(Dulcolax) 5 MG EC tablet, Take 1 tablet by mouth Take As Directed. Take 2 tablets 3pm and take 2 tablets at 5pm, Disp: 4 tablet, Rfl: 0  •  losartan (COZAAR) 25 MG tablet, Take 2 tablets by mouth 2 (Two) Times a Day., Disp: , Rfl:   •  metoprolol succinate XL (TOPROL-XL) 50 MG 24 hr tablet, Take 1 tablet by mouth Daily., Disp: , Rfl: 11  •  Multiple Vitamin (MULTI-VITAMIN DAILY PO), Take 1 tablet by mouth Daily., Disp: , Rfl:   •  NIFEdipine XL (PROCARDIA XL) 30 MG 24 hr tablet, Take 1 tablet by mouth Daily., Disp: , Rfl:   •  pantoprazole (PROTONIX) 20 MG EC tablet, Take 1 tablet by mouth Daily., Disp: , Rfl:   •  sertraline (ZOLOFT) 50 MG tablet, Take 2 tablets by mouth 2 (Two) Times a Day., Disp: , Rfl:   •  traZODone (DESYREL) 100 MG tablet, Take 1.5 tablets by mouth Every Night., Disp: , Rfl:   •  Fluticasone-Umeclidin-Vilant (TRELEGY) 100-62.5-25 MCG/ACT inhaler, Inhale 1 puff Daily., Disp: 1 each, Rfl: 5    Allergies:   Allergies   Allergen Reactions   • Sulfa Antibiotics Hives and Other (See Comments)     ITCHING ,FLUSHING, SEVERE LOWER BACK PAIN  Flushing, kidney pain       Objective     Physical Exam:  Vital Signs:   Vitals:    05/05/23 1107 05/05/23 1111   BP: 110/60    Pulse: 74 75   Resp: 18    SpO2: (!) 88%  Comment: on ra 95%  Comment: on 2LPM AT REST   Weight: 62.5 kg (137 lb 12.8 oz)    Height: 167.6 cm (66\")        Physical Exam  Vitals and nursing note reviewed.   Constitutional:       Appearance: She is well-developed. She is not toxic-appearing.      Comments: Appears chronically ill, thin  female   HENT:      Head: Normocephalic and atraumatic.      Right Ear: External ear normal.      Left Ear: External ear normal.      Nose: Nose normal. No congestion or rhinorrhea.      Mouth/Throat:      Mouth: Mucous membranes are moist.      Pharynx: Oropharynx is clear. No oropharyngeal exudate or posterior oropharyngeal erythema.   Eyes:      General: No scleral icterus.        Right eye: No " discharge.         Left eye: No discharge.      Extraocular Movements: Extraocular movements intact.      Conjunctiva/sclera: Conjunctivae normal.   Neck:      Trachea: No tracheal deviation.   Cardiovascular:      Rate and Rhythm: Normal rate and regular rhythm.      Heart sounds: No murmur heard.  Pulmonary:      Effort: No respiratory distress.      Breath sounds: No wheezing or rhonchi.      Comments: Decreased breath sounds throughout  Abdominal:      General: There is no distension.      Palpations: Abdomen is soft.   Musculoskeletal:         General: No tenderness. Normal range of motion.      Cervical back: Normal range of motion and neck supple.      Right lower leg: No edema.      Left lower leg: No edema.   Skin:     General: Skin is warm and dry.      Findings: No rash.   Neurological:      Mental Status: She is alert and oriented to person, place, and time.      Coordination: Coordination normal.      Gait: Gait normal.   Psychiatric:         Mood and Affect: Mood normal.         Judgment: Judgment normal.         Results Review:   Labs: Reviewed.  Lab Results   Component Value Date    WBC 9.87 09/22/2022    HGB 14.4 09/22/2022    HCT 40.3 09/22/2022    MCV 99 (H) 09/22/2022     09/22/2022     Lab Results   Component Value Date    GLUCOSE 103 (H) 04/23/2021    BUN 8 11/02/2021    CREATININE 0.38 (L) 11/02/2021    EGFR 103 11/16/2016    BCR 21 11/02/2021    K 3.8 11/02/2021    CO2 26 11/02/2021    CALCIUM 9.6 11/02/2021    ALBUMIN 4.2 11/02/2021    BILITOT 0.6 10/29/2021    AST 53 (H) 10/29/2021    ALT 36 (H) 10/29/2021       No results found for: CBCDIF, CMP     Micro: As of May 5, 2023   No results found for: RESPCX  No results found for: BLOODCX  No results found for: URINECX  No results found for: MRSACX  No results found for: MRSAPCR  No results found for: URCX  No components found for: LOWRESPCF  No results found for: THROATCX  No results found for: CULTURES  No components found for:  STREPBCX  No results found for: STREPPNEUAG  No results found for: LEGIONELLA  No results found for: MYCOPLASCX  No results found for: GCCX  No results found for: WOUNDCX  No results found for: BODYFLDCX    ABG:   Lab Results   Component Value Date    PHART 7.428 05/18/2016    JYA9LEH 41.0 05/18/2016    FCOHB 1.6 05/18/2016    FMETHB 0.5 05/18/2016       Echo:     Radiology Scans:   Last CT scan was reviewed in great detail with the patient.     September 2022 CT scan of the chest in [facility] showed no pulmonary emboli, but partial atelectasis of bilateral lower lobes and mild groundglass related to pulmonary edema versus infection.  Small pericardial effusion.  Fluid-filled patulous esophagus.  Barium swallow showed concern for possible esophageal web.  No evidence of aspiration.      PFT IMPRESSION:    2021 PFT Report from [facility] suggested no bronchodilator responsiveness, but decreased FVC and concern over possible restrictive pattern    Assessment / Plan      Assessment/Plan:    1. Centrilobular emphysema  Transition from Stiolto to Trelegy for triple therapy.  Discussed risk, benefits and possible side effects of medications.  Advised to take medication as prescribed as well as rinsing her mouth out after use.  Check full PFTs prior to next clinic visit.  Advised to use oxygen as prescribed.    - Pulmonary Function Test; Future  - Fluticasone-Umeclidin-Vilant (TRELEGY) 100-62.5-25 MCG/ACT inhaler; Inhale 1 puff Daily.  Dispense: 1 each; Refill: 5    2. Nicotine dependence, cigarettes, uncomplicated  Patient will be due for repeat low-dose lung cancer screening CT scan in September.  -  CT Chest Low Dose Cancer Screening WO; Future    3. Gastroesophageal reflux disease without esophagitis  On PPI    4. Esophageal web  Advised her to follow-up with GI.       Follow Up:   Return in about 3 months (around 8/5/2023).    Aleshia Crane MD  Pulmonary/Critical Care Physician    Boy      Please note that portions of this note may have been completed with a voice recognition program. Efforts were made to edit the dictations, but occasionally words are mistranscribed.

## 2023-05-11 ENCOUNTER — TELEPHONE (OUTPATIENT)
Dept: SURGERY | Facility: CLINIC | Age: 63
End: 2023-05-11
Payer: COMMERCIAL

## 2023-05-11 DIAGNOSIS — J43.2 CENTRILOBULAR EMPHYSEMA: Primary | ICD-10-CM

## 2023-05-11 RX ORDER — BUDESONIDE, GLYCOPYRROLATE, AND FORMOTEROL FUMARATE 160; 9; 4.8 UG/1; UG/1; UG/1
2 AEROSOL, METERED RESPIRATORY (INHALATION) 2 TIMES DAILY
Qty: 32.1 EACH | Refills: 3 | Status: SHIPPED | OUTPATIENT
Start: 2023-05-11

## 2023-05-11 NOTE — TELEPHONE ENCOUNTER
I spoke to the patient, she does not need to reschedule at this time, she is trying to figure out her transportation if she can not get transportation by Monday may 15th she will give us a call back and reschedule.  
Patient called and may need to reschedule her colonoscopy.  Please call at [phone].  
EXTRA STRENGTH) 500 MG tablet Take 2 tablets by mouth every 6 hours as needed for Pain, Disp-30 tablet, R-0Print      ibuprofen (ADVIL;MOTRIN) 600 MG tablet Take 1 tablet by mouth every 6 hours as needed for Pain, Disp-30 tablet, R-0Print      lovastatin (MEVACOR) 20 MG tablet Take 1 tablet by mouth nightly, Disp-90 tablet, R-3Normal      montelukast (SINGULAIR) 10 MG tablet Historical Med      fenofibrate (TRICOR) 145 MG tablet Take 145 mg by mouth dailyHistorical Med      FARXIGA 5 MG tablet Take 5 mg by mouth every morning , DAWHistorical Med      glimepiride (AMARYL) 4 MG tablet Take 4 mg by mouth 2 times daily Historical Med      BASAGLAR KWIKPEN 100 UNIT/ML injection pen Inject 60 Units into the skin daily , DAWHistorical Med      Insulin Pen Needle (B-D UF III MINI PEN NEEDLES) 31G X 5 MM MISC DAILY Starting Fri 6/14/2019, Disp-100 each, R-3, Normal      losartan (COZAAR) 50 MG tablet Take 1 tablet by mouth daily, Disp-90 tablet, R-3Normal      VASCEPA 1 g CAPS capsule Take 2 capsules by mouth daily, Disp-180 capsule, R-3, DAWNormal      aspirin 81 MG chewable tablet Take 1 tablet by mouth daily Hold for 5 days after surgery, until 1/16/18, Disp-30 tablet, R-0Normal      VENTOLIN  (90 BASE) MCG/ACT inhaler inhale 2 puff every 4 hours prn, R-0, DAWHistorical Med      JANUMET XR  MG TB24 tablet Take 2 tablets by mouth daily             ALLERGIES     is allergic to seasonal.    FAMILY HISTORY     He indicated that the status of his mother is unknown. He indicated that the status of his father is unknown. He indicated that the status of his sister is unknown. He indicated that the status of his maternal grandmother is unknown. SOCIAL HISTORY      reports that he has never smoked. He has never used smokeless tobacco. He reports current alcohol use. He reports that he does not use drugs.     PHYSICAL EXAM     INITIAL VITALS: BP (!) 151/79   Pulse 74   Temp 97.7 °F (36.5 °C) (Oral)   Resp 16

## 2023-05-12 ENCOUNTER — TELEPHONE (OUTPATIENT)
Dept: SURGERY | Facility: CLINIC | Age: 63
End: 2023-05-12

## 2023-05-15 ENCOUNTER — TELEPHONE (OUTPATIENT)
Dept: SURGERY | Facility: CLINIC | Age: 63
End: 2023-05-15
Payer: COMMERCIAL

## 2023-05-15 NOTE — TELEPHONE ENCOUNTER
Ai with OR called and stated patient told them she was canceling for tomorrow. I told them I would get the message to you so you could reach out to see if she wanted to reschedule. Thanks

## 2023-08-11 ENCOUNTER — OFFICE VISIT (OUTPATIENT)
Dept: PULMONOLOGY | Facility: CLINIC | Age: 63
End: 2023-08-11
Payer: COMMERCIAL

## 2023-08-11 VITALS
DIASTOLIC BLOOD PRESSURE: 78 MMHG | HEIGHT: 66 IN | WEIGHT: 131 LBS | HEART RATE: 60 BPM | SYSTOLIC BLOOD PRESSURE: 140 MMHG | RESPIRATION RATE: 18 BRPM | OXYGEN SATURATION: 90 % | BODY MASS INDEX: 21.05 KG/M2

## 2023-08-11 DIAGNOSIS — J43.2 CENTRILOBULAR EMPHYSEMA: ICD-10-CM

## 2023-08-11 DIAGNOSIS — J43.2 CENTRILOBULAR EMPHYSEMA: Primary | ICD-10-CM

## 2023-08-11 DIAGNOSIS — Q39.4 ESOPHAGEAL WEB: ICD-10-CM

## 2023-08-11 DIAGNOSIS — J98.4 RESTRICTIVE LUNG DISEASE: ICD-10-CM

## 2023-08-11 DIAGNOSIS — K21.9 GASTROESOPHAGEAL REFLUX DISEASE WITHOUT ESOPHAGITIS: ICD-10-CM

## 2023-08-11 DIAGNOSIS — J30.1 SEASONAL ALLERGIC RHINITIS DUE TO POLLEN: ICD-10-CM

## 2023-08-11 DIAGNOSIS — F17.210 NICOTINE DEPENDENCE, CIGARETTES, UNCOMPLICATED: ICD-10-CM

## 2023-08-11 RX ORDER — CONJUGATED ESTROGENS 0.62 MG/G
0.5 CREAM VAGINAL DAILY
COMMUNITY
Start: 2023-07-13

## 2023-08-11 RX ORDER — CYCLOBENZAPRINE HCL 10 MG
10 TABLET ORAL NIGHTLY
COMMUNITY
Start: 2023-07-14

## 2023-08-11 RX ORDER — ALBUTEROL SULFATE 90 UG/1
1 AEROSOL, METERED RESPIRATORY (INHALATION) SEE ADMIN INSTRUCTIONS
COMMUNITY
Start: 2023-08-02 | End: 2023-08-11 | Stop reason: SDUPTHER

## 2023-08-11 RX ORDER — BUDESONIDE, GLYCOPYRROLATE, AND FORMOTEROL FUMARATE 160; 9; 4.8 UG/1; UG/1; UG/1
2 AEROSOL, METERED RESPIRATORY (INHALATION) 2 TIMES DAILY
Qty: 32.1 EACH | Refills: 3 | Status: SHIPPED | OUTPATIENT
Start: 2023-08-11

## 2023-08-11 RX ORDER — DOCUSATE SODIUM 100 MG/1
100 CAPSULE, LIQUID FILLED ORAL 2 TIMES DAILY
COMMUNITY
Start: 2023-07-10

## 2023-08-11 RX ORDER — CETIRIZINE HYDROCHLORIDE 10 MG/1
10 TABLET ORAL DAILY
Qty: 30 TABLET | Refills: 5 | Status: SHIPPED | OUTPATIENT
Start: 2023-08-11

## 2023-08-11 RX ORDER — ALBUTEROL SULFATE 2.5 MG/3ML
2.5 SOLUTION RESPIRATORY (INHALATION)
COMMUNITY
Start: 2023-08-07

## 2023-08-11 RX ORDER — BUSPIRONE HYDROCHLORIDE 7.5 MG/1
1 TABLET ORAL EVERY 12 HOURS SCHEDULED
COMMUNITY
Start: 2023-07-24

## 2023-08-11 RX ORDER — ALBUTEROL SULFATE 90 UG/1
1 AEROSOL, METERED RESPIRATORY (INHALATION) SEE ADMIN INSTRUCTIONS
Qty: 16 G | Refills: 5 | Status: SHIPPED | OUTPATIENT
Start: 2023-08-11

## 2023-08-11 NOTE — PROGRESS NOTES
Pulmonary follow-up office Visit      Patient Name: Katelynn Ribeiro    Referring Physician: No ref. provider found    Chief Complaint:    Chief Complaint   Patient presents with    Breathing Problem    Follow-up       Subjective: Katelynn Ribeiro is a 63 y.o. female who is here today for follow up with Pulmonary for COPD.     Past Medical History: Patient with past medical history of bipolar disorder, COPD requiring 2 L oxygen at home who was admitted in September 2022 [facility] secondary to COPD exacerbation and required antibiotics with azithromycin and ceftriaxone and nebulizer treatments.  During that hospitalization, she also had difficulty with swallowing and there was a concern for an esophageal web, but she has not had EGD since that time.  Sees Dr. Nolan.     Since last visit, she started on Breztri due to formulary and has noticed some benefit.  PFTs today showed mixed obstructive and restrictive defect.  She will get mild to moderate shortness of breath with moderate to heavy exertion, will improve with rest and her albuterol inhalers.  She has intermittent cough which is mostly dry, but denies any current wheezing.  She has noticed some increased postnasal drip and allergy symptoms.    COPD diagnosed about 6 years ago.    Has not been hospitalized other than the episode in September 2022.    Has not required prednisone or antibiotics since that time.     DME is [employer]    Subjective      Review of Systems:   Review of Systems   Respiratory:  Positive for shortness of breath. Negative for cough and wheezing.    Gastrointestinal:  Positive for GERD.   Psychiatric/Behavioral:  The patient is not nervous/anxious.        Social History:   Social History     Socioeconomic History    Marital status: Single   Tobacco Use    Smoking status: Some Days     Packs/day: 0.25     Years: 40.00     Pack years: 10.00     Types: Cigarettes    Smokeless tobacco: Never    Tobacco comments:      PT REPORTS SHE OCCASIONALLY SMOKES 8/11/2023   Vaping Use    Vaping Use: Never used   Substance and Sexual Activity    Alcohol use: Yes     Comment: OCCASSIONAL, REPORTS NO HISTORY OF ABUSE    Drug use: No    Sexual activity: Defer       Medications:     Current Outpatient Medications:     albuterol (PROVENTIL) (2.5 MG/3ML) 0.083% nebulizer solution, Take 2.5 mg by nebulization 4 (Four) Times a Day., Disp: , Rfl:     albuterol sulfate  (90 Base) MCG/ACT inhaler, Inhale 1 puff See Admin Instructions. Inhale 2 puffs by mouth every 4 to 6 hours as needed, Disp: 16 g, Rfl: 5    aspirin 81 MG chewable tablet, Chew 1 tablet Daily., Disp: , Rfl:     atorvastatin (LIPITOR) 20 MG tablet, Take 1 tablet by mouth Daily., Disp: , Rfl:     bisacodyl (Dulcolax) 5 MG EC tablet, Take 1 tablet by mouth Take As Directed. Take 2 tablets 3pm and take 2 tablets at 5pm, Disp: 4 tablet, Rfl: 0    Budeson-Glycopyrrol-Formoterol (Breztri Aerosphere) 160-9-4.8 MCG/ACT aerosol inhaler, Inhale 2 puffs 2 (Two) Times a Day., Disp: 32.1 each, Rfl: 3    busPIRone (BUSPAR) 7.5 MG tablet, Take 1 tablet by mouth Every 12 (Twelve) Hours., Disp: , Rfl:     cyclobenzaprine (FLEXERIL) 10 MG tablet, Take 1 tablet by mouth Every Night., Disp: , Rfl:     docusate sodium (COLACE) 100 MG capsule, Take 1 capsule by mouth 2 (Two) Times a Day., Disp: , Rfl:     losartan (COZAAR) 25 MG tablet, Take 2 tablets by mouth 2 (Two) Times a Day., Disp: , Rfl:     metoprolol succinate XL (TOPROL-XL) 50 MG 24 hr tablet, Take 1 tablet by mouth Daily., Disp: , Rfl: 11    Multiple Vitamin (MULTI-VITAMIN DAILY PO), Take 1 tablet by mouth Daily., Disp: , Rfl:     naloxone (NARCAN) 4 MG/0.1ML nasal spray, Call 911. Draper into nostril upon signs of overdose. May repeat in 2-3 minutes in other nostril if no or minimal breathing and responsiveness., Disp: 2 each, Rfl: 0    NIFEdipine XL (PROCARDIA XL) 30 MG 24 hr tablet, Take 1 tablet by mouth Daily., Disp: , Rfl:      "pantoprazole (PROTONIX) 20 MG EC tablet, Take 1 tablet by mouth Daily., Disp: , Rfl:     Premarin 0.625 MG/GM vaginal cream, Insert 0.5 g into the vagina Daily., Disp: , Rfl:     sertraline (ZOLOFT) 50 MG tablet, Take 2 tablets by mouth 2 (Two) Times a Day., Disp: , Rfl:     traZODone (DESYREL) 100 MG tablet, Take 1.5 tablets by mouth Every Night., Disp: , Rfl:     cetirizine (zyrTEC) 10 MG tablet, Take 1 tablet by mouth Daily., Disp: 30 tablet, Rfl: 5    Allergies:   Allergies   Allergen Reactions    Sulfa Antibiotics Hives and Other (See Comments)     ITCHING ,FLUSHING, SEVERE LOWER BACK PAIN  Flushing, kidney pain       Objective     Physical Exam:  Vital Signs:   Vitals:    08/11/23 1030   BP: 140/78   Pulse: 60   Resp: 18   SpO2: 90%  Comment: on RA   Weight: 59.4 kg (131 lb)   Height: 167.6 cm (66\")       Physical Exam  Vitals and nursing note reviewed.   Constitutional:       General: She is not in acute distress.     Appearance: She is well-developed. She is not ill-appearing or toxic-appearing.      Comments: Appears chronically ill, thin  female   HENT:      Head: Normocephalic and atraumatic.      Right Ear: External ear normal.      Left Ear: External ear normal.      Nose: Nose normal. Congestion and rhinorrhea present.      Mouth/Throat:      Mouth: Mucous membranes are moist.      Pharynx: Oropharynx is clear. No oropharyngeal exudate or posterior oropharyngeal erythema.   Eyes:      General: No scleral icterus.        Right eye: No discharge.         Left eye: No discharge.      Extraocular Movements: Extraocular movements intact.      Conjunctiva/sclera: Conjunctivae normal.   Neck:      Trachea: No tracheal deviation.   Cardiovascular:      Rate and Rhythm: Normal rate and regular rhythm.      Heart sounds: No murmur heard.  Pulmonary:      Effort: No respiratory distress.      Breath sounds: No wheezing or rhonchi.      Comments: Decreased breath sounds throughout  Abdominal:      " General: There is no distension.      Palpations: Abdomen is soft.   Musculoskeletal:         General: No tenderness. Normal range of motion.      Cervical back: Normal range of motion and neck supple.      Right lower leg: No edema.      Left lower leg: No edema.   Skin:     General: Skin is warm and dry.      Findings: No rash.   Neurological:      Mental Status: She is alert and oriented to person, place, and time.      Coordination: Coordination normal.      Gait: Gait normal.   Psychiatric:         Mood and Affect: Mood normal.         Judgment: Judgment normal.       Results Review:   Labs: Reviewed.  Lab Results   Component Value Date    WBC 9.87 09/22/2022    HGB 14.4 09/22/2022    HCT 40.3 09/22/2022    MCV 99 (H) 09/22/2022     09/22/2022     Lab Results   Component Value Date    GLUCOSE 103 (H) 04/23/2021    BUN 8 11/02/2021    CREATININE 0.38 (L) 11/02/2021    EGFR 103 11/16/2016    BCR 21 11/02/2021    K 3.8 11/02/2021    CO2 26 11/02/2021    CALCIUM 9.6 11/02/2021    ALBUMIN 4.2 11/02/2021    BILITOT 0.6 10/29/2021    AST 53 (H) 10/29/2021    ALT 36 (H) 10/29/2021       No results found for: CBCDIF, CMP     Micro: As of August 11, 2023   No results found for: RESPCX  No results found for: BLOODCX  No results found for: URINECX  No results found for: MRSACX  No results found for: MRSAPCR  No results found for: URCX  No components found for: LOWRESPCF  No results found for: THROATCX  No results found for: CULTURES  No components found for: STREPBCX  No results found for: STREPPNEUAG  No results found for: LEGIONELLA  No results found for: MYCOPLASCX  No results found for: GCCX  No results found for: WOUNDCX  No results found for: BODYFLDCX    ABG:   Lab Results   Component Value Date    PHART 7.428 05/18/2016    FNW1FIL 41.0 05/18/2016    FCOHB 1.6 05/18/2016    FMETHB 0.5 05/18/2016       Echo:     Radiology Scans:   Last CT scan was reviewed in great detail with the patient.     September 2022  CT scan of the chest showed no pulmonary emboli, but partial atelectasis of bilateral lower lobes and mild groundglass related to pulmonary edema versus infection.  Small pericardial effusion.  Fluid-filled patulous esophagus.  Barium swallow showed concern for possible esophageal web.  No evidence of aspiration.      PFT IMPRESSION:   August 2023 PFT showed FEV1 50%, FEV1/FVC ratio 84.  No significant bronchodilator responsiveness.  Air-trapping noted.  Restriction with TLC 63%.  Normal diffusing capacity with DL/%.   2021 PFT Report from [facility] suggested no bronchodilator responsiveness, but decreased FVC and concern over possible restrictive pattern    Assessment / Plan      Assessment/Plan:    1. Centrilobular emphysema  Well-controlled with current regimen of Breztri and as needed albuterol.  Reviewed PFTs with patient.    - albuterol sulfate  (90 Base) MCG/ACT inhaler; Inhale 1 puff See Admin Instructions. Inhale 2 puffs by mouth every 4 to 6 hours as needed  Dispense: 16 g; Refill: 5  - Budeson-Glycopyrrol-Formoterol (Breztri Aerosphere) 160-9-4.8 MCG/ACT aerosol inhaler; Inhale 2 puffs 2 (Two) Times a Day.  Dispense: 32.1 each; Refill: 3    2. Restrictive lung disease  Some restriction noted on PFTs.  Has low-dose lung cancer screening CT scan scheduled for next month.  If any significant interstitial changes noted can follow-up with high-resolution CT scan.    3. Gastroesophageal reflux disease without esophagitis  Continue on current regimen    4. Esophageal web  Encouraged her to follow-up for EGD, but states that transportation has been an issue for her as her boyfriend works out of town and she will need someone to drive her home.  Transportation van is now requiring her to have someone with her.    5. Nicotine dependence, cigarettes, uncomplicated  Encourage smoking cessation, but states she is not ready to quit    6. Seasonal allergic rhinitis due to pollen  Start daily  antihistamines  - cetirizine (zyrTEC) 10 MG tablet; Take 1 tablet by mouth Daily.  Dispense: 30 tablet; Refill: 5      Follow Up:   Return in about 6 months (around 2/11/2024).    Aleshia Crane MD  Pulmonary/Critical Care Physician   Boy      Please note that portions of this note may have been completed with a voice recognition program. Efforts were made to edit the dictations, but occasionally words are mistranscribed.

## 2023-10-10 ENCOUNTER — HOSPITAL ENCOUNTER (OUTPATIENT)
Dept: CT IMAGING | Facility: HOSPITAL | Age: 63
Discharge: HOME | End: 2023-10-10
Admitting: INTERNAL MEDICINE
Payer: COMMERCIAL

## 2023-10-10 DIAGNOSIS — F17.210 NICOTINE DEPENDENCE, CIGARETTES, UNCOMPLICATED: ICD-10-CM

## 2023-10-10 PROCEDURE — 71271 CT THORAX LUNG CANCER SCR C-: CPT

## 2023-10-16 DIAGNOSIS — R91.1 LUNG NODULE SEEN ON IMAGING STUDY: Primary | ICD-10-CM

## 2023-12-05 ENCOUNTER — TELEPHONE (OUTPATIENT)
Dept: PULMONOLOGY | Facility: CLINIC | Age: 63
End: 2023-12-05

## 2023-12-06 DIAGNOSIS — K21.9 GASTROESOPHAGEAL REFLUX DISEASE WITHOUT ESOPHAGITIS: Primary | ICD-10-CM

## 2023-12-06 DIAGNOSIS — Q39.4 ESOPHAGEAL WEB: ICD-10-CM

## 2024-01-02 ENCOUNTER — TELEPHONE (OUTPATIENT)
Dept: PULMONOLOGY | Facility: CLINIC | Age: 64
End: 2024-01-02

## 2024-01-02 NOTE — TELEPHONE ENCOUNTER
Caller: Katelynn Ribeiro    Relationship to patient: Self    Best call back number: [phone]    Patient is needing: PATIENT IS WANTING TO KNOW IF THERE IS ANOTHER DOCTOR SHE CAN BE REFERRED TO FOR THE ESOPHAGEAL WEB. SHE SAID THE DOCTOR SHE WAS REFERRED TO CAN NOT GET HER IN UNTIL MARCH SO SHE IS WANTING TO KNOW IF THERE IS ANOTHER DOCTOR THAT COULD SEE HER SOONER.

## 2024-01-25 DIAGNOSIS — J30.1 SEASONAL ALLERGIC RHINITIS DUE TO POLLEN: ICD-10-CM

## 2024-01-25 RX ORDER — CETIRIZINE HYDROCHLORIDE 10 MG/1
10 TABLET ORAL DAILY
Qty: 90 TABLET | Refills: 1 | Status: SHIPPED | OUTPATIENT
Start: 2024-01-25

## 2024-02-01 ENCOUNTER — HOSPITAL ENCOUNTER (EMERGENCY)
Facility: HOSPITAL | Age: 64
Discharge: HOME | End: 2024-02-02
Attending: STUDENT IN AN ORGANIZED HEALTH CARE EDUCATION/TRAINING PROGRAM
Payer: COMMERCIAL

## 2024-02-01 DIAGNOSIS — J44.1 COPD EXACERBATION: Primary | ICD-10-CM

## 2024-02-01 PROCEDURE — 99284 EMERGENCY DEPT VISIT MOD MDM: CPT

## 2024-02-01 PROCEDURE — 93005 ELECTROCARDIOGRAM TRACING: CPT | Performed by: STUDENT IN AN ORGANIZED HEALTH CARE EDUCATION/TRAINING PROGRAM

## 2024-02-01 NOTE — Clinical Note
[facility] EMERGENCY DEPARTMENT  [address] [address]  Phone: [phone]    Katelynn Ribeiro was seen and treated in our emergency department on 2/1/2024.  She may return to work on 02/05/2024.         Thank you for choosing [facility].    David Mason MD

## 2024-02-02 ENCOUNTER — APPOINTMENT (OUTPATIENT)
Dept: GENERAL RADIOLOGY | Facility: HOSPITAL | Age: 64
End: 2024-02-02
Payer: COMMERCIAL

## 2024-02-02 ENCOUNTER — TELEPHONE (OUTPATIENT)
Dept: PULMONOLOGY | Facility: CLINIC | Age: 64
End: 2024-02-02
Payer: COMMERCIAL

## 2024-02-02 VITALS
SYSTOLIC BLOOD PRESSURE: 135 MMHG | WEIGHT: 135 LBS | TEMPERATURE: 97.6 F | OXYGEN SATURATION: 95 % | HEIGHT: 66 IN | RESPIRATION RATE: 16 BRPM | DIASTOLIC BLOOD PRESSURE: 70 MMHG | HEART RATE: 81 BPM | BODY MASS INDEX: 21.69 KG/M2

## 2024-02-02 LAB
ALBUMIN SERPL-MCNC: 5.1 G/DL (ref 3.5–5.2)
ALBUMIN/GLOB SERPL: 1.4 G/DL
ALP SERPL-CCNC: 115 U/L (ref 39–117)
ALT SERPL W P-5'-P-CCNC: 10 U/L (ref 1–33)
ANION GAP SERPL CALCULATED.3IONS-SCNC: 15 MMOL/L (ref 5–15)
AST SERPL-CCNC: 23 U/L (ref 1–32)
BASOPHILS # BLD AUTO: 0.03 10*3/MM3 (ref 0–0.2)
BASOPHILS NFR BLD AUTO: 0.3 % (ref 0–1.5)
BILIRUB SERPL-MCNC: 0.5 MG/DL (ref 0–1.2)
BUN SERPL-MCNC: 4 MG/DL (ref 8–23)
BUN/CREAT SERPL: 8.5 (ref 7–25)
CALCIUM SPEC-SCNC: 9.8 MG/DL (ref 8.6–10.5)
CHLORIDE SERPL-SCNC: 79 MMOL/L (ref 98–107)
CO2 SERPL-SCNC: 26 MMOL/L (ref 22–29)
CREAT SERPL-MCNC: 0.47 MG/DL (ref 0.57–1)
CRP SERPL-MCNC: 0.38 MG/DL (ref 0–0.5)
DEPRECATED RDW RBC AUTO: 44.4 FL (ref 37–54)
EGFRCR SERPLBLD CKD-EPI 2021: 106.5 ML/MIN/1.73
EOSINOPHIL # BLD AUTO: 0.09 10*3/MM3 (ref 0–0.4)
EOSINOPHIL NFR BLD AUTO: 1 % (ref 0.3–6.2)
ERYTHROCYTE [DISTWIDTH] IN BLOOD BY AUTOMATED COUNT: 12.3 % (ref 12.3–15.4)
FLUAV SUBTYP SPEC NAA+PROBE: NOT DETECTED
FLUBV RNA ISLT QL NAA+PROBE: NOT DETECTED
GEN 5 2HR TROPONIN T REFLEX: 45 NG/L
GLOBULIN UR ELPH-MCNC: 3.6 GM/DL
GLUCOSE SERPL-MCNC: 81 MG/DL (ref 65–99)
HCT VFR BLD AUTO: 34.8 % (ref 34–46.6)
HGB BLD-MCNC: 12.7 G/DL (ref 12–15.9)
IMM GRANULOCYTES # BLD AUTO: 0.06 10*3/MM3 (ref 0–0.05)
IMM GRANULOCYTES NFR BLD AUTO: 0.7 % (ref 0–0.5)
LYMPHOCYTES # BLD AUTO: 2.03 10*3/MM3 (ref 0.7–3.1)
LYMPHOCYTES NFR BLD AUTO: 22.3 % (ref 19.6–45.3)
MCH RBC QN AUTO: 36.1 PG (ref 26.6–33)
MCHC RBC AUTO-ENTMCNC: 36.5 G/DL (ref 31.5–35.7)
MCV RBC AUTO: 98.9 FL (ref 79–97)
MONOCYTES # BLD AUTO: 0.77 10*3/MM3 (ref 0.1–0.9)
MONOCYTES NFR BLD AUTO: 8.5 % (ref 5–12)
NEUTROPHILS NFR BLD AUTO: 6.13 10*3/MM3 (ref 1.7–7)
NEUTROPHILS NFR BLD AUTO: 67.2 % (ref 42.7–76)
NRBC BLD AUTO-RTO: 0 /100 WBC (ref 0–0.2)
NT-PROBNP SERPL-MCNC: 436.7 PG/ML (ref 0–900)
PLATELET # BLD AUTO: 270 10*3/MM3 (ref 140–450)
PMV BLD AUTO: 9.1 FL (ref 6–12)
POTASSIUM SERPL-SCNC: 4.1 MMOL/L (ref 3.5–5.2)
PROCALCITONIN SERPL-MCNC: 0.02 NG/ML (ref 0–0.25)
PROT SERPL-MCNC: 8.7 G/DL (ref 6–8.5)
RBC # BLD AUTO: 3.52 10*6/MM3 (ref 3.77–5.28)
SARS-COV-2 RNA RESP QL NAA+PROBE: NOT DETECTED
SODIUM SERPL-SCNC: 120 MMOL/L (ref 136–145)
TROPONIN T DELTA: -6 NG/L
TROPONIN T SERPL HS-MCNC: 51 NG/L
WBC NRBC COR # BLD AUTO: 9.11 10*3/MM3 (ref 3.4–10.8)

## 2024-02-02 PROCEDURE — 96365 THER/PROPH/DIAG IV INF INIT: CPT

## 2024-02-02 PROCEDURE — 84145 PROCALCITONIN (PCT): CPT | Performed by: STUDENT IN AN ORGANIZED HEALTH CARE EDUCATION/TRAINING PROGRAM

## 2024-02-02 PROCEDURE — 25010000002 MAGNESIUM SULFATE 2 GM/50ML SOLUTION: Performed by: STUDENT IN AN ORGANIZED HEALTH CARE EDUCATION/TRAINING PROGRAM

## 2024-02-02 PROCEDURE — 87636 SARSCOV2 & INF A&B AMP PRB: CPT | Performed by: STUDENT IN AN ORGANIZED HEALTH CARE EDUCATION/TRAINING PROGRAM

## 2024-02-02 PROCEDURE — 84484 ASSAY OF TROPONIN QUANT: CPT | Performed by: STUDENT IN AN ORGANIZED HEALTH CARE EDUCATION/TRAINING PROGRAM

## 2024-02-02 PROCEDURE — 86140 C-REACTIVE PROTEIN: CPT | Performed by: STUDENT IN AN ORGANIZED HEALTH CARE EDUCATION/TRAINING PROGRAM

## 2024-02-02 PROCEDURE — 71045 X-RAY EXAM CHEST 1 VIEW: CPT

## 2024-02-02 PROCEDURE — 80053 COMPREHEN METABOLIC PANEL: CPT | Performed by: STUDENT IN AN ORGANIZED HEALTH CARE EDUCATION/TRAINING PROGRAM

## 2024-02-02 PROCEDURE — 94760 N-INVAS EAR/PLS OXIMETRY 1: CPT

## 2024-02-02 PROCEDURE — 85025 COMPLETE CBC W/AUTO DIFF WBC: CPT | Performed by: STUDENT IN AN ORGANIZED HEALTH CARE EDUCATION/TRAINING PROGRAM

## 2024-02-02 PROCEDURE — 25010000002 DEXAMETHASONE SODIUM PHOSPHATE 10 MG/ML SOLUTION: Performed by: STUDENT IN AN ORGANIZED HEALTH CARE EDUCATION/TRAINING PROGRAM

## 2024-02-02 PROCEDURE — 94799 UNLISTED PULMONARY SVC/PX: CPT

## 2024-02-02 PROCEDURE — 83880 ASSAY OF NATRIURETIC PEPTIDE: CPT | Performed by: STUDENT IN AN ORGANIZED HEALTH CARE EDUCATION/TRAINING PROGRAM

## 2024-02-02 PROCEDURE — 94761 N-INVAS EAR/PLS OXIMETRY MLT: CPT

## 2024-02-02 PROCEDURE — 36415 COLL VENOUS BLD VENIPUNCTURE: CPT

## 2024-02-02 PROCEDURE — 94640 AIRWAY INHALATION TREATMENT: CPT

## 2024-02-02 PROCEDURE — 96375 TX/PRO/DX INJ NEW DRUG ADDON: CPT

## 2024-02-02 RX ORDER — AZITHROMYCIN 250 MG/1
TABLET, FILM COATED ORAL
Qty: 6 TABLET | Refills: 0 | Status: SHIPPED | OUTPATIENT
Start: 2024-02-02

## 2024-02-02 RX ORDER — ACETAMINOPHEN 500 MG
1000 TABLET ORAL ONCE
Status: COMPLETED | OUTPATIENT
Start: 2024-02-02 | End: 2024-02-02

## 2024-02-02 RX ORDER — MAGNESIUM SULFATE HEPTAHYDRATE 40 MG/ML
2 INJECTION, SOLUTION INTRAVENOUS ONCE
Status: COMPLETED | OUTPATIENT
Start: 2024-02-02 | End: 2024-02-02

## 2024-02-02 RX ORDER — DEXAMETHASONE SODIUM PHOSPHATE 10 MG/ML
10 INJECTION, SOLUTION INTRAMUSCULAR; INTRAVENOUS ONCE
Status: COMPLETED | OUTPATIENT
Start: 2024-02-02 | End: 2024-02-02

## 2024-02-02 RX ORDER — PREDNISONE 20 MG/1
40 TABLET ORAL DAILY
Qty: 10 TABLET | Refills: 0 | Status: SHIPPED | OUTPATIENT
Start: 2024-02-02 | End: 2024-02-07

## 2024-02-02 RX ORDER — IPRATROPIUM BROMIDE AND ALBUTEROL SULFATE 2.5; .5 MG/3ML; MG/3ML
3 SOLUTION RESPIRATORY (INHALATION) ONCE
Status: COMPLETED | OUTPATIENT
Start: 2024-02-02 | End: 2024-02-02

## 2024-02-02 RX ADMIN — MAGNESIUM SULFATE HEPTAHYDRATE 2 G: 40 INJECTION, SOLUTION INTRAVENOUS at 01:18

## 2024-02-02 RX ADMIN — ACETAMINOPHEN 1000 MG: 500 TABLET, FILM COATED ORAL at 01:28

## 2024-02-02 RX ADMIN — IPRATROPIUM BROMIDE AND ALBUTEROL SULFATE 3 ML: .5; 3 SOLUTION RESPIRATORY (INHALATION) at 01:08

## 2024-02-02 RX ADMIN — DEXAMETHASONE SODIUM PHOSPHATE 10 MG: 10 INJECTION INTRAMUSCULAR; INTRAVENOUS at 01:16

## 2024-02-02 NOTE — TELEPHONE ENCOUNTER
Caller: Katelynn Ribeiro    Relationship to patient: Self    Best call back number: [phone]    Patient is needing: PT WOULD LIKE DR TO LOOK AT HER CHART NOTES FROM THE ER.

## 2024-02-02 NOTE — DISCHARGE INSTRUCTIONS
You were evaluated due to shortness of breath.  We got labs and chest x-ray that showed no concerns for acute process.  As we discussed, we believe that you likely have a COPD exacerbation as etiology of her symptoms.  We have prescribed you a course of steroids and azithromycin to help take down the swelling in your lungs and recommend following up primary care doctor to ensure that you improve appropriately.  You are now stable for discharge.

## 2024-02-02 NOTE — ED PROVIDER NOTES
Subjective:  History of Present Illness:    Patient is 63-year-old female history of COPD on 3 L nasal cannula, GERD, hypertension, hyperlipidemia presents today with shortness of breath, lack of appetite.  States onset of symptoms after the last 2 to 3 days.  Felt acutely dyspneic this evening and now presents our emergency department for evaluation.  She denies any chest pain.  No preceding fevers.  No productive cough.  Denies any change in her bowel habits.  No dysuria.  No unilateral leg swelling or leg pain.  Denies any personal history of PE/DVT.      Nurses Notes reviewed and agree, including vitals, allergies, social history and prior medical history.     REVIEW OF SYSTEMS: All systems reviewed and not pertinent unless noted.  Review of Systems   Constitutional:  Positive for activity change. Negative for appetite change, chills, fatigue and fever.   HENT:  Positive for congestion. Negative for rhinorrhea, sinus pressure and sinus pain.    Eyes:  Negative for discharge and itching.   Respiratory:  Positive for cough and shortness of breath.    Cardiovascular:  Negative for chest pain and leg swelling.   Gastrointestinal:  Positive for nausea. Negative for abdominal distention, abdominal pain and vomiting.   Endocrine: Negative for cold intolerance and heat intolerance.   Genitourinary:  Negative for decreased urine volume, difficulty urinating, flank pain, frequency, urgency, vaginal bleeding, vaginal discharge and vaginal pain.   Musculoskeletal:  Negative for gait problem, neck pain and neck stiffness.   Skin:  Negative for color change.   Allergic/Immunologic: Negative for environmental allergies.   Neurological:  Negative for seizures, syncope, facial asymmetry and speech difficulty.   Psychiatric/Behavioral:  Negative for self-injury and suicidal ideas.        Past Medical History:   Diagnosis Date    Anxiety     Arthritis     Body piercing     BOTH EARS    Constipation     COPD (chronic obstructive  pulmonary disease)     Depression     Elevated cholesterol     Esophagitis     Fracture, clavicle     GERD (gastroesophageal reflux disease)     History of bronchitis     History of fracture     HISTORY OF LEFT WRIST AS A CHILD, RIGHT WRIST AUGUST 2018 AND REQUIRED SURGICAL INTERVENTION    History of tattoo     X1    Hyperlipidemia     Hypertension     Seasonal allergies     Wears dentures     FULL DENTURES - INSTRUCTED NO ADHESIVES THE DOS    Wears glasses        Allergies:    Sulfa antibiotics      Past Surgical History:   Procedure Laterality Date    COLONOSCOPY      COLONOSCOPY N/A 4/11/2018    Procedure: COLONOSCOPY WITH SNARE POLYPECTOMY;  Surgeon: Tan Nolan MD;  Location: [facility] ENDOSCOPY;  Service: Gastroenterology    MOUTH SURGERY      FULL EXTRACTION    ORIF ULNA/RADIUS FRACTURES Right 9/5/2018    Procedure: WRIST OPEN REDUCTION INTERNAL FIXATION DISTAL RADIUS RIGHT (SKELETAL DYNAMICS-IRIS HUTTON);  Surgeon: Grant Dougherty MD;  Location: [facility] OR;  Service: Orthopedics    ORIF WRIST FRACTURE Right 12/19/2018    Procedure: RIGHT THUMB EXTENSOR INDICUS PROPRIUS TO EXTENSOR POLLICUS LONGUS TENDON EXPLORATION;  Surgeon: Grant Dougherty MD;  Location: [facility] OR;  Service: Orthopedics    UPPER GASTROINTESTINAL ENDOSCOPY           Social History     Socioeconomic History    Marital status: Single   Tobacco Use    Smoking status: Some Days     Packs/day: 0.25     Years: 40.00     Additional pack years: 0.00     Total pack years: 10.00     Types: Cigarettes    Smokeless tobacco: Never    Tobacco comments:     PT REPORTS SHE OCCASIONALLY SMOKES 8/11/2023   Vaping Use    Vaping Use: Never used   Substance and Sexual Activity    Alcohol use: Yes     Comment: OCCASSIONAL, REPORTS NO HISTORY OF ABUSE    Drug use: No    Sexual activity: Defer         Family History   Problem Relation Age of Onset    Cirrhosis Other        Objective  Physical Exam:  /70 (BP Location: Left arm, Patient Position: Lying)  "  Pulse 81   Temp 97.6 °F (36.4 °C) (Oral)   Resp 16   Ht 167.6 cm (66\")   Wt 61.2 kg (135 lb)   LMP  (LMP Unknown)   SpO2 95%   BMI 21.79 kg/m²      Physical Exam  Constitutional:       General: She is not in acute distress.     Appearance: Normal appearance. She is not ill-appearing.   HENT:      Head: Normocephalic and atraumatic.      Nose: Nose normal. No congestion or rhinorrhea.      Mouth/Throat:      Mouth: Mucous membranes are dry.      Pharynx: Oropharynx is clear. No oropharyngeal exudate or posterior oropharyngeal erythema.   Eyes:      Extraocular Movements: Extraocular movements intact.      Conjunctiva/sclera: Conjunctivae normal.      Pupils: Pupils are equal, round, and reactive to light.   Cardiovascular:      Rate and Rhythm: Normal rate and regular rhythm.      Pulses: Normal pulses.      Heart sounds: Normal heart sounds.   Pulmonary:      Effort: Pulmonary effort is normal. No tachypnea, accessory muscle usage or respiratory distress.      Breath sounds: Normal breath sounds.   Abdominal:      General: Abdomen is flat. Bowel sounds are normal. There is no distension.      Palpations: Abdomen is soft.      Tenderness: There is no abdominal tenderness.   Musculoskeletal:         General: No swelling or tenderness. Normal range of motion.      Cervical back: Normal range of motion and neck supple.   Skin:     General: Skin is warm and dry.      Capillary Refill: Capillary refill takes less than 2 seconds.   Neurological:      General: No focal deficit present.      Mental Status: She is alert and oriented to person, place, and time. Mental status is at baseline.      Cranial Nerves: No cranial nerve deficit.      Sensory: No sensory deficit.      Motor: No weakness.      Coordination: Coordination normal.   Psychiatric:         Mood and Affect: Mood normal.         Behavior: Behavior normal.         Thought Content: Thought content normal.         Judgment: Judgment normal. "         Procedures    ED Course:    ED Course as of 02/02/24 0403   Fri Feb 02, 2024 0010 EKG interpreted by me, normal sinus rhythm with no concerning ST changes noted, rate of 81 [JE]      ED Course User Index  [JE] David Mason MD       Lab Results (last 24 hours)       Procedure Component Value Units Date/Time    COVID-19 and FLU A/B PCR, 1 HR TAT - Swab, Nasopharynx [[id number]]  (Normal) Collected: 02/02/24 0005    Specimen: Swab from Nasopharynx Updated: 02/02/24 0039     COVID19 Not Detected     Influenza A PCR Not Detected     Influenza B PCR Not Detected    Narrative:      Fact sheet for providers: https://www.fda.gov/media/785989/download    Fact sheet for patients: https://www.fda.gov/media/852900/download    Test performed by PCR.    CBC & Differential [[id number]]  (Abnormal) Collected: 02/02/24 0024    Specimen: Blood Updated: 02/02/24 0034    Narrative:      The following orders were created for panel order CBC & Differential.  Procedure                               Abnormality         Status                     ---------                               -----------         ------                     CBC Auto Differential[[id number]]        Abnormal            Final result                 Please view results for these tests on the individual orders.    Comprehensive Metabolic Panel [[id number]]  (Abnormal) Collected: 02/02/24 0024    Specimen: Blood Updated: 02/02/24 0105     Glucose 81 mg/dL      BUN 4 mg/dL      Creatinine 0.47 mg/dL      Sodium 120 mmol/L      Potassium 4.1 mmol/L      Chloride 79 mmol/L      CO2 26.0 mmol/L      Calcium 9.8 mg/dL      Total Protein 8.7 g/dL      Albumin 5.1 g/dL      ALT (SGPT) 10 U/L      AST (SGOT) 23 U/L      Alkaline Phosphatase 115 U/L      Total Bilirubin 0.5 mg/dL      Globulin 3.6 gm/dL      A/G Ratio 1.4 g/dL      BUN/Creatinine Ratio 8.5     Anion Gap 15.0 mmol/L      eGFR 106.5 mL/min/1.73     Narrative:      GFR Normal >60  Chronic Kidney Disease  <60  Kidney Failure <15      BNP [[id number]]  (Normal) Collected: 02/02/24 0024    Specimen: Blood Updated: 02/02/24 0059     proBNP 436.7 pg/mL     Narrative:      This assay is used as an aid in the diagnosis of individuals suspected of having heart failure. It can be used as an aid in the diagnosis of acute decompensated heart failure (ADHF) in patients presenting with signs and symptoms of ADHF to the emergency department (ED). In addition, NT-proBNP of <300 pg/mL indicates ADHF is not likely.    Age Range Result Interpretation  NT-proBNP Concentration (pg/mL:      <50             Positive            >450                   Gray                 300-450                    Negative             <300    50-75           Positive            >900                  Gray                300-900                  Negative            <300      >75             Positive            >1800                  Gray                300-1800                  Negative            <300    High Sensitivity Troponin T [[id number]]  (Abnormal) Collected: 02/02/24 0024    Specimen: Blood Updated: 02/02/24 0059     HS Troponin T 51 ng/L     Narrative:      High Sensitive Troponin T Reference Range:  <14.0 ng/L- Negative Female for AMI  <22.0 ng/L- Negative Male for AMI  >=14 - Abnormal Female indicating possible myocardial injury.  >=22 - Abnormal Male indicating possible myocardial injury.   Clinicians would have to utilize clinical acumen, EKG, Troponin, and serial changes to determine if it is an Acute Myocardial Infarction or myocardial injury due to an underlying chronic condition.         C-reactive Protein [[id number]]  (Normal) Collected: 02/02/24 0024    Specimen: Blood Updated: 02/02/24 0056     C-Reactive Protein 0.38 mg/dL     Procalcitonin [[id number]]  (Normal) Collected: 02/02/24 0024    Specimen: Blood Updated: 02/02/24 0101     Procalcitonin 0.02 ng/mL     Narrative:      As a Marker for Sepsis (Non-Neonates):    1. <0.5 ng/mL  "represents a low risk of severe sepsis and/or septic shock.  2. >2 ng/mL represents a high risk of severe sepsis and/or septic shock.    As a Marker for Lower Respiratory Tract Infections that require antibiotic therapy:    PCT on Admission    Antibiotic Therapy       6-12 Hrs later    >0.5                Strongly Recommended  >0.25 - <0.5        Recommended   0.1 - 0.25          Discouraged              Remeasure/reassess PCT  <0.1                Strongly Discouraged     Remeasure/reassess PCT    As 28 day mortality risk marker: \"Change in Procalcitonin Result\" (>80% or <=80%) if Day 0 (or Day 1) and Day 4 values are available. Refer to http://www.[facility]om[facility] [place]-pct-calculator.com    Change in PCT <=80%  A decrease of PCT levels below or equal to 80% defines a positive change in PCT test result representing a higher risk for 28-day all-cause mortality of patients diagnosed with severe sepsis for septic shock.    Change in PCT >80%  A decrease of PCT levels of more than 80% defines a negative change in PCT result representing a lower risk for 28-day all-cause mortality of patients diagnosed with severe sepsis or septic shock.       CBC Auto Differential [[id number]]  (Abnormal) Collected: 02/02/24 0024    Specimen: Blood Updated: 02/02/24 0034     WBC 9.11 10*3/mm3      RBC 3.52 10*6/mm3      Hemoglobin 12.7 g/dL      Hematocrit 34.8 %      MCV 98.9 fL      MCH 36.1 pg      MCHC 36.5 g/dL      RDW 12.3 %      RDW-SD 44.4 fl      MPV 9.1 fL      Platelets 270 10*3/mm3      Neutrophil % 67.2 %      Lymphocyte % 22.3 %      Monocyte % 8.5 %      Eosinophil % 1.0 %      Basophil % 0.3 %      Immature Grans % 0.7 %      Neutrophils, Absolute 6.13 10*3/mm3      Lymphocytes, Absolute 2.03 10*3/mm3      Monocytes, Absolute 0.77 10*3/mm3      Eosinophils, Absolute 0.09 10*3/mm3      Basophils, Absolute 0.03 10*3/mm3      Immature Grans, Absolute 0.06 10*3/mm3      nRBC 0.0 /100 WBC     High Sensitivity Troponin T 2Hr [[id number]]  " (Abnormal) Collected: 02/02/24 0223    Specimen: Blood Updated: 02/02/24 0248     HS Troponin T 45 ng/L      Troponin T Delta -6 ng/L     Narrative:      High Sensitive Troponin T Reference Range:  <14.0 ng/L- Negative Female for AMI  <22.0 ng/L- Negative Male for AMI  >=14 - Abnormal Female indicating possible myocardial injury.  >=22 - Abnormal Male indicating possible myocardial injury.   Clinicians would have to utilize clinical acumen, EKG, Troponin, and serial changes to determine if it is an Acute Myocardial Infarction or myocardial injury due to an underlying chronic condition.                  No radiology results from the last 24 hrs       MDM     Amount and/or Complexity of Data Reviewed  Independent visualization of images, tracings, or specimens: yes        Initial impression of presenting illness: Shortness of breath    DDX: includes but is not limited to: COPD exacerbation, viral URI, CHF exacerbation    Patient arrives stable with vitals interpreted by myself.     Pertinent features from physical exam: Clear to auscultation, regular rate rhythm, no murmur, nontender abdominal palpation.    Initial diagnostic plan: CBC, CMP, troponin, ECG, chest x-ray, BMP    Results from initial plan were reviewed and interpreted by me revealing no concern for acute findings, no concern for pneumonia, patient remained stable    Diagnostic information from other sources: Reviewed past medical records and discussed with significant other at bedside    Interventions / Re-evaluation: Given Decadron, magnesium, DuoNeb given concerns for COPD exacerbation with improved symptoms on reassessment    Results/clinical rationale were discussed with patient at bedside    Consultations/Discussion of results with other physicians: Discussed negative workup in emergency department, or concern for COPD exacerbation.  Given concerns for COPD exacerbation sent prescription for prednisone, azithromycin.  Encouraged follow-up with PCP to  ensure resolution of symptoms and given strict turn precaution for any increase in shortness of breath, severe chest pain    Disposition plan: Discharge  -----        Final diagnoses:   COPD exacerbation          David Mason MD  02/02/24 3173

## 2024-03-15 ENCOUNTER — OFFICE VISIT (OUTPATIENT)
Dept: PULMONOLOGY | Facility: CLINIC | Age: 64
End: 2024-03-15
Payer: COMMERCIAL

## 2024-03-15 VITALS
RESPIRATION RATE: 18 BRPM | HEART RATE: 74 BPM | OXYGEN SATURATION: 95 % | BODY MASS INDEX: 22.95 KG/M2 | WEIGHT: 142.8 LBS | DIASTOLIC BLOOD PRESSURE: 54 MMHG | SYSTOLIC BLOOD PRESSURE: 112 MMHG | HEIGHT: 66 IN
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
## 2024-03-15 DIAGNOSIS — J43.2 CENTRILOBULAR EMPHYSEMA: Primary | ICD-10-CM

## 2024-03-15 DIAGNOSIS — R91.1 LUNG NODULE SEEN ON IMAGING STUDY: ICD-10-CM

## 2024-03-15 DIAGNOSIS — Q39.4 ESOPHAGEAL WEB: ICD-10-CM

## 2024-03-15 RX ORDER — LAMOTRIGINE 100 MG/1
1 TABLET ORAL EVERY 12 HOURS SCHEDULED
COMMUNITY
Start: 2023-12-28

## 2024-03-15 RX ORDER — BUDESONIDE, GLYCOPYRROLATE, AND FORMOTEROL FUMARATE 160; 9; 4.8 UG/1; UG/1; UG/1
2 AEROSOL, METERED RESPIRATORY (INHALATION) 2 TIMES DAILY
Qty: 32.1 EACH | Refills: 3 | Status: SHIPPED | OUTPATIENT
Start: 2024-03-15

## 2024-03-15 RX ORDER — TRAZODONE HYDROCHLORIDE 150 MG/1
150 TABLET ORAL
COMMUNITY
Start: 2024-01-18

## 2024-03-15 RX ORDER — LOSARTAN POTASSIUM 50 MG/1
1 TABLET ORAL EVERY 12 HOURS SCHEDULED
COMMUNITY
Start: 2023-12-28

## 2024-03-15 RX ORDER — ALBUTEROL SULFATE 90 UG/1
1 AEROSOL, METERED RESPIRATORY (INHALATION) SEE ADMIN INSTRUCTIONS
Qty: 16 G | Refills: 5 | Status: SHIPPED | OUTPATIENT
Start: 2024-03-15

## 2024-03-15 RX ORDER — SERTRALINE HYDROCHLORIDE 100 MG/1
2 TABLET, FILM COATED ORAL DAILY
COMMUNITY
Start: 2023-12-28

## 2024-03-15 NOTE — PROGRESS NOTES
Pulmonary follow-up office Visit      Patient Name: Katelynn Ribeiro    Referring Physician: No ref. provider found    Chief Complaint:    Chief Complaint   Patient presents with    Follow-up    Breathing Problem       Subjective: Katelynn Ribeiro is a 63 y.o. female who is here today for follow up with Pulmonary for COPD.     Past Medical History: Patient with past medical history of bipolar disorder, COPD requiring 2 L oxygen at home who was admitted in September 2022 [facility] secondary to COPD exacerbation and required antibiotics with azithromycin and ceftriaxone and nebulizer treatments.  During that hospitalization, she also had difficulty with swallowing and there was a concern for an esophageal web.     Since last visit, she continues on Breztri and requires albuterol about 2 times per day which is her normal.  She would like to go back to [facility] when insurance formulary changes.    She still has moderate exertional dyspnea, but admits she has fatigue at times.  She feels like she has slowly worsened in relation to her fatigue and energy levels.  She has had multiple falls and the most recent was several weeks ago in [place].  She did not seek treatment at that time.  Denies any dizziness or shortness of breath at that time, but admits that she is worried about getting reliant on her oxygen and does not always wear it.    She is accompanied by her  today who has multiple questions.    COPD diagnosed in about 2017.  Has not been hospitalized other than the episode in September 2022.    Has not required prednisone or antibiotics since last visit.    DME is [employer]  She normally wears 2 L nasal cannula, but does admit on long car rides increasing up to 3 L    Subjective      Review of Systems:   Review of Systems   Constitutional:  Positive for fatigue.   Respiratory:  Positive for shortness of breath. Negative for cough.          Social History:   Social History      Socioeconomic History    Marital status: Single   Tobacco Use    Smoking status: Former     Current packs/day: 0.00     Average packs/day: 0.3 packs/day for 40.0 years (10.0 ttl pk-yrs)     Types: Cigarettes     Quit date:      Years since quittin.2    Smokeless tobacco: Never    Tobacco comments:     PT REPORTS SHE OCCASIONALLY SMOKES 2023   Vaping Use    Vaping status: Never Used   Substance and Sexual Activity    Alcohol use: Yes     Comment: OCCASSIONAL, REPORTS NO HISTORY OF ABUSE    Drug use: No    Sexual activity: Defer       Medications:     Current Outpatient Medications:     albuterol (PROVENTIL) (2.5 MG/3ML) 0.083% nebulizer solution, Take 2.5 mg by nebulization 4 (Four) Times a Day., Disp: , Rfl:     albuterol sulfate  (90 Base) MCG/ACT inhaler, Inhale 1 puff See Admin Instructions. Inhale 2 puffs by mouth every 4 to 6 hours as needed, Disp: 16 g, Rfl: 5    aspirin 81 MG chewable tablet, Chew 1 tablet Daily., Disp: , Rfl:     atorvastatin (LIPITOR) 20 MG tablet, Take 1 tablet by mouth Daily., Disp: , Rfl:     bisacodyl (Dulcolax) 5 MG EC tablet, Take 1 tablet by mouth Take As Directed. Take 2 tablets 3pm and take 2 tablets at 5pm, Disp: 4 tablet, Rfl: 0    Budeson-Glycopyrrol-Formoterol (Breztri Aerosphere) 160-9-4.8 MCG/ACT aerosol inhaler, Inhale 2 puffs 2 (Two) Times a Day., Disp: 32.1 each, Rfl: 3    busPIRone (BUSPAR) 7.5 MG tablet, Take 1 tablet by mouth Every 12 (Twelve) Hours., Disp: , Rfl:     cetirizine (zyrTEC) 10 MG tablet, TAKE 1 TABLET BY MOUTH EVERY DAY, Disp: 90 tablet, Rfl: 1    lamoTRIgine (LaMICtal) 100 MG tablet, Take 1 tablet by mouth Every 12 (Twelve) Hours., Disp: , Rfl:     losartan (COZAAR) 50 MG tablet, Take 1 tablet by mouth Every 12 (Twelve) Hours., Disp: , Rfl:     metoprolol succinate XL (TOPROL-XL) 50 MG 24 hr tablet, Take 1 tablet by mouth Daily., Disp: , Rfl: 11    Multiple Vitamin (MULTI-VITAMIN DAILY PO), Take 1 tablet by mouth Daily., Disp: ,  "Rfl:     NIFEdipine XL (PROCARDIA XL) 30 MG 24 hr tablet, Take 1 tablet by mouth Daily., Disp: , Rfl:     pantoprazole (PROTONIX) 20 MG EC tablet, Take 1 tablet by mouth Daily., Disp: , Rfl:     sertraline (ZOLOFT) 100 MG tablet, Take 2 tablets by mouth Daily., Disp: , Rfl:     traZODone (DESYREL) 150 MG tablet, Take 1 tablet by mouth every night at bedtime., Disp: , Rfl:     azithromycin (Zithromax Z-Nilesh) 250 MG tablet, Take 2 tablets by mouth on day 1, then 1 tablet daily on days 2-5, Disp: 6 tablet, Rfl: 0    cyclobenzaprine (FLEXERIL) 10 MG tablet, Take 1 tablet by mouth Every Night., Disp: , Rfl:     docusate sodium (COLACE) 100 MG capsule, Take 1 capsule by mouth 2 (Two) Times a Day., Disp: , Rfl:     losartan (COZAAR) 25 MG tablet, Take 2 tablets by mouth 2 (Two) Times a Day. (Patient not taking: Reported on 3/15/2024), Disp: , Rfl:     naloxone (NARCAN) 4 MG/0.1ML nasal spray, Call 911. [place] into nostril upon signs of overdose. May repeat in 2-3 minutes in other nostril if no or minimal breathing and responsiveness., Disp: 2 each, Rfl: 0    Premarin 0.625 MG/GM vaginal cream, Insert 0.5 g into the vagina Daily., Disp: , Rfl:     sertraline (ZOLOFT) 50 MG tablet, Take 2 tablets by mouth 2 (Two) Times a Day. (Patient not taking: Reported on 3/15/2024), Disp: , Rfl:     traZODone (DESYREL) 100 MG tablet, Take 1.5 tablets by mouth Every Night. (Patient not taking: Reported on 3/15/2024), Disp: , Rfl:     Allergies:   Allergies   Allergen Reactions    Sulfa Antibiotics Hives and Other (See Comments)     ITCHING ,FLUSHING, SEVERE LOWER BACK PAIN  Flushing, kidney pain       Objective     Physical Exam:  Vital Signs:   Vitals:    03/15/24 1012   BP: 112/54   Pulse: 74   Resp: 18   SpO2: 95%  Comment: 93% RA at rest   Weight: 64.8 kg (142 lb 12.8 oz)   Height: 167.6 cm (65.98\")     ============================  ============================    6 MINUTE WALK TEST    Katelynn Ribeiro   1960 "             BASELINE   SpO2%: 91 % RA    Heart Rate 72   Blood Pressure 112/54     EXERCISE SpO2% HEART RATE RA or O2 @ LPM   1 MINUTE 86 87 Ra^2lpm   2 MINUTES 90 83 2   3 MINUTES 90 87 2   4 MINUTES 91 89 2   5 MINUTES 89 88 2   6 MINUTES 90 83 2   (Number of laps: 5 X 36 meters + Final partial lap: 0 meters = 180 meters)            Distance Walked:  180 Meters   SpO2% Post Exercise:  94 % 2lpm   HR Post Exercise:  72     Reason to stop (if applicable):   ____ Chest Pain   ____ Light Headedness   ____ Dyspnea Unrelieved by Rest   ____ Abnormal Gait Pattern   ____ Severe Fatigue   ____ Other (Specify: __________________________)    Tech Comments (if any):      Test performed by:  TC    ============================  ============================     Physical Exam  Vitals and nursing note reviewed.   Constitutional:       General: She is not in acute distress.     Appearance: She is well-developed. She is not ill-appearing or toxic-appearing.      Comments: Appears chronically ill, holding her oxygen cannula in her hand   HENT:      Head: Normocephalic and atraumatic.      Right Ear: External ear normal.      Left Ear: External ear normal.      Nose: No congestion or rhinorrhea.      Mouth/Throat:      Mouth: Mucous membranes are moist.      Pharynx: Oropharynx is clear. No oropharyngeal exudate or posterior oropharyngeal erythema.   Eyes:      General:         Right eye: No discharge.         Left eye: No discharge.      Extraocular Movements: Extraocular movements intact.      Conjunctiva/sclera: Conjunctivae normal.   Neck:      Trachea: No tracheal deviation.   Cardiovascular:      Rate and Rhythm: Normal rate and regular rhythm.      Heart sounds: No murmur heard.  Pulmonary:      Effort: No respiratory distress.      Breath sounds: No wheezing or rhonchi.      Comments: Decreased breath sounds throughout  Abdominal:      General: There is no distension.      Palpations: Abdomen is soft.   Musculoskeletal:         " General: No tenderness. Normal range of motion.      Cervical back: Normal range of motion and neck supple.      Right lower leg: No edema.      Left lower leg: No edema.   Skin:     General: Skin is warm and dry.      Findings: No rash.      Comments: No clubbing or cyanosis   Neurological:      Mental Status: She is alert and oriented to person, place, and time.      Coordination: Coordination normal.      Gait: Gait normal.   Psychiatric:         Mood and Affect: Mood normal.         Judgment: Judgment normal.         Results Review:   Labs: Reviewed.  Lab Results   Component Value Date    WBC 9.11 02/02/2024    HGB 12.7 02/02/2024    HCT 34.8 02/02/2024    MCV 98.9 (H) 02/02/2024     02/02/2024     Lab Results   Component Value Date    GLUCOSE 81 02/02/2024    BUN 4 (L) 02/02/2024    CREATININE 0.47 (L) 02/02/2024    EGFR 106.5 02/02/2024    BCR 8.5 02/02/2024    K 4.1 02/02/2024    CO2 26.0 02/02/2024    CALCIUM 9.8 02/02/2024    ALBUMIN 5.1 02/02/2024    BILITOT 0.5 02/02/2024    AST 23 02/02/2024    ALT 10 02/02/2024       No results found for: \"CBCDIF\", \"CMP\"     Micro: As of March 15, 2024   No results found for: \"RESPCX\"  No results found for: \"BLOODCX\"  No results found for: \"URINECX\"  No results found for: \"MRSACX\"  No results found for: \"MRSAPCR\"  No results found for: \"URCX\"  No components found for: \"LOWRESPCF\"  No results found for: \"THROATCX\"  No results found for: \"CULTURES\"  No components found for: \"STREPBCX\"  No results found for: \"STREPPNEUAG\"  No results found for: \"LEGIONELLA\"  No results found for: \"MYCOPLASCX\"  No results found for: \"GCCX\"  No results found for: \"WOUNDCX\"  No results found for: \"BODYFLDCX\"    ABG:   Lab Results   Component Value Date    PHART 7.428 05/18/2016    QQM7FON 41.0 05/18/2016    FCOHB 1.6 05/18/2016    FMETHB 0.5 05/18/2016       Echo:     Radiology Scans:   Last CT scan was reviewed in great detail with the patient.     PROCEDURE: CT CHEST LOW DOSE CANCER " SCREENING WO-     HISTORY: Lung cancer screening, >= 20 pk-yr smoking history (Age >=  50y); F17.210-Nicotine dependence, cigarettes, uncomplicated     TECHNIQUE: Axial images were obtained from the thoracic inlet through  the upper abdomen per the low-dose protocol. Coronal images were  reformatted. DLP 18.83 mGy.cm ; CTDI 0.53 mGy.     COMPARISON: August 19, 2016..     FINDINGS: There is centrilobular emphysema. There is new 6 mm  pleural-based nodule, suspect pleural-parenchymal scarring but not  identified on the prior study. Recommend 6-month follow-up. There are  new linear densities posteriorly in the right lower lobe likely  atelectasis or scar. Similar findings seen in the left lower lobe. Left  upper lobe 5 mm nodule laterally is stable.. 2 mm nodule laterally in  the right upper lobe is also stable.. There is no airspace disease. The  heart is normal in size. The aorta is normal in caliber. There are no  pleural or pericardial effusions. There are 2 new areas of calcification  anteriorly in the right lobe and centrally in the right lobe. These do  not have the typical appearance of calcified granulomas. Recommend liver  mass protocol CT.. Bone windows reveal no acute osseous abnormalities.  Vascular calcifications noted.     IMPRESSION:  Lung RADS category 3: Probably benign.     RECOMMENDATIONS: 6-month follow-up low-dose chest CT..     This report was signed and finalized on 10/10/2023 2:49 PM by Belkis Hernández MD.    September 2022 CT scan of the chest showed no pulmonary emboli, but partial atelectasis of bilateral lower lobes and mild groundglass related to pulmonary edema versus infection.  Small pericardial effusion.  Fluid-filled patulous esophagus.  Barium swallow showed concern for possible esophageal web.  No evidence of aspiration.      PFT IMPRESSION:   August 2023 PFT showed FEV1 50%, FEV1/FVC ratio 84.  No significant bronchodilator responsiveness.  Air-trapping noted.  Restriction with TLC  63%.  Normal diffusing capacity with DL/%.   2021 PFT Report from [facility] suggested no bronchodilator responsiveness, but decreased FVC and concern over possible restrictive pattern    Assessment / Plan      Assessment/Plan:    1. Centrilobular emphysema  Her  had a lot of questions about her disease state.  They both seem confused and unaware that this is a progressive disease.  We discussed that damage done from previous smoking cannot be fixed, but we are only working on making what she has left more efficient.  Discussed recent PFTs and that we would plan to repeat PFTs at next clinic visit.    Discussed the need to consistently use oxygen  6-minute walk done during clinic today showed no need for titration of current oxygen level.  She is to remain on 2 L nasal cannula 24/7  At 1 point, her  asked if she had cancer of the lungs and if COPD was a form of cancer.  I then attempted to go back and discussed disease process, damage from smoking and how that cannot be changed, but current treatment including appropriate inhalers and consistent use of oxygen can help make the lung function left more efficient.    - 6 Minute Walk Test; Future  - Complete PFT - Pre & Post Bronchodilator; Future    2. Esophageal web  Has appointment with GI    3. Lung nodule seen on imaging study  Repeat CT scan due next month    4. Fall, initial encounter  Discussed that 6-minute walk during clinic did not show need for titration of oxygen and I do not feel like this is related to hypoxia and/or dizziness causing her falls.  I have advised her to follow-up with PCP as it appears to be more of a weakness issue.  She admitted that she felt pretty weak during 6-minute walk.  Encouraged more consistent exercise and strength training.          Follow Up:   Return in about 6 months (around 9/15/2024) for PFT day of clinic appointment.    Aleshia Crane MD  Pulmonary/Critical Care Physician    Boy      Please note that portions of this note may have been completed with a voice recognition program. Efforts were made to edit the dictations, but occasionally words are mistranscribed.

## 2024-04-05 ENCOUNTER — TELEPHONE (OUTPATIENT)
Dept: PULMONOLOGY | Facility: CLINIC | Age: 64
End: 2024-04-05

## 2024-04-15 ENCOUNTER — HOSPITAL ENCOUNTER (OUTPATIENT)
Dept: CT IMAGING | Facility: HOSPITAL | Age: 64
Discharge: HOME | End: 2024-04-15
Admitting: INTERNAL MEDICINE
Payer: MEDICARE

## 2024-04-15 DIAGNOSIS — R91.1 LUNG NODULE SEEN ON IMAGING STUDY: ICD-10-CM

## 2024-04-15 PROCEDURE — 71250 CT THORAX DX C-: CPT

## 2024-04-16 ENCOUNTER — TELEPHONE (OUTPATIENT)
Dept: PULMONOLOGY | Facility: CLINIC | Age: 64
End: 2024-04-16
Payer: MEDICARE

## 2024-04-16 DIAGNOSIS — F17.210 CIGARETTE NICOTINE DEPENDENCE WITHOUT COMPLICATION: Primary | ICD-10-CM

## 2024-04-16 NOTE — TELEPHONE ENCOUNTER
Hub staff attempted to follow warm transfer process and was unsuccessful     Caller: Katelynn Ribeiro    Relationship to patient: Self    Best call back number: [phone]     Patient is needing: PT WANTS TO KNOW CT SCAN RESULTS ASAP, SHE SAYS OK TO [facility] OR [facility]UB OK TO RELY MSG, PLEASE ADVISE.

## 2024-04-16 NOTE — TELEPHONE ENCOUNTER
Informed patient, per Dr. Crane, that her CT scan showed no new changes and that her nodule was stable.  They recommend CT yearly now and Dr. Crane has gone ahead and ordered it. Patient states understanding of information and agrees to course of action.

## 2024-04-16 NOTE — TELEPHONE ENCOUNTER
----- Message from Aleshia Crane MD sent at 4/16/2024  1:08 PM EDT -----  Please call and let her know that her CT scan showed no new changes and that her nodule was stable.  They recommend CT yearly now and I have gone ahead and ordered it.

## 2024-04-16 NOTE — TELEPHONE ENCOUNTER
Informed patient that we do not have results at this time.  Will call after provider reviews scan.

## 2024-05-31 ENCOUNTER — APPOINTMENT (OUTPATIENT)
Dept: CT IMAGING | Facility: HOSPITAL | Age: 64
End: 2024-05-31
Payer: MEDICARE

## 2024-05-31 ENCOUNTER — HOSPITAL ENCOUNTER (EMERGENCY)
Facility: HOSPITAL | Age: 64
Discharge: HOME | End: 2024-06-01
Attending: STUDENT IN AN ORGANIZED HEALTH CARE EDUCATION/TRAINING PROGRAM
Payer: MEDICARE

## 2024-05-31 DIAGNOSIS — F10.930 ALCOHOL WITHDRAWAL SYNDROME WITHOUT COMPLICATION: Primary | ICD-10-CM

## 2024-05-31 DIAGNOSIS — E87.1 HYPONATREMIA: ICD-10-CM

## 2024-05-31 LAB
ALBUMIN SERPL-MCNC: 4.5 G/DL (ref 3.5–5.2)
ALBUMIN/GLOB SERPL: 1.6 G/DL
ALP SERPL-CCNC: 84 U/L (ref 39–117)
ALT SERPL W P-5'-P-CCNC: 13 U/L (ref 1–33)
AMPHET+METHAMPHET UR QL: NEGATIVE
AMPHETAMINES UR QL: NEGATIVE
ANION GAP SERPL CALCULATED.3IONS-SCNC: 12.5 MMOL/L (ref 5–15)
APAP SERPL-MCNC: <5 MCG/ML (ref 0–30)
AST SERPL-CCNC: 20 U/L (ref 1–32)
BACTERIA UR QL AUTO: ABNORMAL /HPF
BARBITURATES UR QL SCN: NEGATIVE
BASOPHILS # BLD AUTO: 0.03 10*3/MM3 (ref 0–0.2)
BASOPHILS NFR BLD AUTO: 0.3 % (ref 0–1.5)
BENZODIAZ UR QL SCN: NEGATIVE
BILIRUB SERPL-MCNC: 0.3 MG/DL (ref 0–1.2)
BILIRUB UR QL STRIP: NEGATIVE
BUN SERPL-MCNC: 17 MG/DL (ref 8–23)
BUN/CREAT SERPL: 22.4 (ref 7–25)
BUPRENORPHINE SERPL-MCNC: NEGATIVE NG/ML
CALCIUM SPEC-SCNC: 9.4 MG/DL (ref 8.6–10.5)
CANNABINOIDS SERPL QL: NEGATIVE
CHLORIDE SERPL-SCNC: 88 MMOL/L (ref 98–107)
CLARITY UR: CLEAR
CO2 SERPL-SCNC: 24.5 MMOL/L (ref 22–29)
COCAINE UR QL: NEGATIVE
COLOR UR: YELLOW
CREAT SERPL-MCNC: 0.76 MG/DL (ref 0.57–1)
DEPRECATED RDW RBC AUTO: 42.5 FL (ref 37–54)
EGFRCR SERPLBLD CKD-EPI 2021: 87.1 ML/MIN/1.73
EOSINOPHIL # BLD AUTO: 0.03 10*3/MM3 (ref 0–0.4)
EOSINOPHIL NFR BLD AUTO: 0.3 % (ref 0.3–6.2)
ERYTHROCYTE [DISTWIDTH] IN BLOOD BY AUTOMATED COUNT: 11.8 % (ref 12.3–15.4)
ETHANOL BLD-MCNC: <10 MG/DL (ref 0–10)
ETHANOL UR QL: <0.01 %
FENTANYL UR-MCNC: NEGATIVE NG/ML
FLUAV RNA RESP QL NAA+PROBE: NOT DETECTED
FLUBV RNA RESP QL NAA+PROBE: NOT DETECTED
GLOBULIN UR ELPH-MCNC: 2.9 GM/DL
GLUCOSE SERPL-MCNC: 94 MG/DL (ref 65–99)
GLUCOSE UR STRIP-MCNC: NEGATIVE MG/DL
HCT VFR BLD AUTO: 32.7 % (ref 34–46.6)
HGB BLD-MCNC: 11.6 G/DL (ref 12–15.9)
HGB UR QL STRIP.AUTO: NEGATIVE
HOLD SPECIMEN: NORMAL
HOLD SPECIMEN: NORMAL
HYALINE CASTS UR QL AUTO: ABNORMAL /LPF
IMM GRANULOCYTES # BLD AUTO: 0.04 10*3/MM3 (ref 0–0.05)
IMM GRANULOCYTES NFR BLD AUTO: 0.4 % (ref 0–0.5)
KETONES UR QL STRIP: NEGATIVE
LEUKOCYTE ESTERASE UR QL STRIP.AUTO: ABNORMAL
LYMPHOCYTES # BLD AUTO: 2.04 10*3/MM3 (ref 0.7–3.1)
LYMPHOCYTES NFR BLD AUTO: 22.2 % (ref 19.6–45.3)
MCH RBC QN AUTO: 34.6 PG (ref 26.6–33)
MCHC RBC AUTO-ENTMCNC: 35.5 G/DL (ref 31.5–35.7)
MCV RBC AUTO: 97.6 FL (ref 79–97)
METHADONE UR QL SCN: NEGATIVE
MONOCYTES # BLD AUTO: 1.31 10*3/MM3 (ref 0.1–0.9)
MONOCYTES NFR BLD AUTO: 14.2 % (ref 5–12)
NEUTROPHILS NFR BLD AUTO: 5.75 10*3/MM3 (ref 1.7–7)
NEUTROPHILS NFR BLD AUTO: 62.6 % (ref 42.7–76)
NITRITE UR QL STRIP: NEGATIVE
NRBC BLD AUTO-RTO: 0 /100 WBC (ref 0–0.2)
OPIATES UR QL: NEGATIVE
OXYCODONE UR QL SCN: NEGATIVE
PCP UR QL SCN: NEGATIVE
PH UR STRIP.AUTO: 7 [PH] (ref 5–8)
PLATELET # BLD AUTO: 273 10*3/MM3 (ref 140–450)
PMV BLD AUTO: 8.9 FL (ref 6–12)
POTASSIUM SERPL-SCNC: 4.3 MMOL/L (ref 3.5–5.2)
PROT SERPL-MCNC: 7.4 G/DL (ref 6–8.5)
PROT UR QL STRIP: NEGATIVE
RBC # BLD AUTO: 3.35 10*6/MM3 (ref 3.77–5.28)
RBC # UR STRIP: ABNORMAL /HPF
REF LAB TEST METHOD: ABNORMAL
SALICYLATES SERPL-MCNC: <0.3 MG/DL
SARS-COV-2 RNA RESP QL NAA+PROBE: NOT DETECTED
SODIUM SERPL-SCNC: 125 MMOL/L (ref 136–145)
SP GR UR STRIP: 1.01 (ref 1–1.03)
SQUAMOUS #/AREA URNS HPF: ABNORMAL /HPF
TRICYCLICS UR QL SCN: NEGATIVE
UROBILINOGEN UR QL STRIP: ABNORMAL
WBC # UR STRIP: ABNORMAL /HPF
WBC NRBC COR # BLD AUTO: 9.2 10*3/MM3 (ref 3.4–10.8)
WHOLE BLOOD HOLD COAG: NORMAL
WHOLE BLOOD HOLD SPECIMEN: NORMAL

## 2024-05-31 PROCEDURE — 87636 SARSCOV2 & INF A&B AMP PRB: CPT | Performed by: STUDENT IN AN ORGANIZED HEALTH CARE EDUCATION/TRAINING PROGRAM

## 2024-05-31 PROCEDURE — 25010000002 THIAMINE PER 100 MG: Performed by: STUDENT IN AN ORGANIZED HEALTH CARE EDUCATION/TRAINING PROGRAM

## 2024-05-31 PROCEDURE — 99284 EMERGENCY DEPT VISIT MOD MDM: CPT

## 2024-05-31 PROCEDURE — 80179 DRUG ASSAY SALICYLATE: CPT | Performed by: STUDENT IN AN ORGANIZED HEALTH CARE EDUCATION/TRAINING PROGRAM

## 2024-05-31 PROCEDURE — 80143 DRUG ASSAY ACETAMINOPHEN: CPT | Performed by: STUDENT IN AN ORGANIZED HEALTH CARE EDUCATION/TRAINING PROGRAM

## 2024-05-31 PROCEDURE — 82077 ASSAY SPEC XCP UR&BREATH IA: CPT | Performed by: STUDENT IN AN ORGANIZED HEALTH CARE EDUCATION/TRAINING PROGRAM

## 2024-05-31 PROCEDURE — 96374 THER/PROPH/DIAG INJ IV PUSH: CPT

## 2024-05-31 PROCEDURE — 80307 DRUG TEST PRSMV CHEM ANLYZR: CPT | Performed by: STUDENT IN AN ORGANIZED HEALTH CARE EDUCATION/TRAINING PROGRAM

## 2024-05-31 PROCEDURE — 70450 CT HEAD/BRAIN W/O DYE: CPT

## 2024-05-31 PROCEDURE — 81001 URINALYSIS AUTO W/SCOPE: CPT | Performed by: STUDENT IN AN ORGANIZED HEALTH CARE EDUCATION/TRAINING PROGRAM

## 2024-05-31 PROCEDURE — 36415 COLL VENOUS BLD VENIPUNCTURE: CPT

## 2024-05-31 PROCEDURE — 80053 COMPREHEN METABOLIC PANEL: CPT | Performed by: STUDENT IN AN ORGANIZED HEALTH CARE EDUCATION/TRAINING PROGRAM

## 2024-05-31 PROCEDURE — 85025 COMPLETE CBC W/AUTO DIFF WBC: CPT | Performed by: STUDENT IN AN ORGANIZED HEALTH CARE EDUCATION/TRAINING PROGRAM

## 2024-05-31 RX ORDER — LORAZEPAM 0.5 MG/1
1 TABLET ORAL EVERY 12 HOURS SCHEDULED
Status: DISCONTINUED | OUTPATIENT
Start: 2024-06-02 | End: 2024-06-01 | Stop reason: HOSPADM

## 2024-05-31 RX ORDER — SODIUM CHLORIDE 0.9 % (FLUSH) 0.9 %
10 SYRINGE (ML) INJECTION AS NEEDED
Status: DISCONTINUED | OUTPATIENT
Start: 2024-05-31 | End: 2024-06-01 | Stop reason: HOSPADM

## 2024-05-31 RX ORDER — LORAZEPAM 0.5 MG/1
1 TABLET ORAL
Status: DISCONTINUED | OUTPATIENT
Start: 2024-05-31 | End: 2024-06-01 | Stop reason: HOSPADM

## 2024-05-31 RX ORDER — LORAZEPAM 2 MG/ML
2 INJECTION INTRAMUSCULAR
Status: DISCONTINUED | OUTPATIENT
Start: 2024-05-31 | End: 2024-06-01 | Stop reason: HOSPADM

## 2024-05-31 RX ORDER — FOLIC ACID 1 MG/1
1 TABLET ORAL DAILY
Status: DISCONTINUED | OUTPATIENT
Start: 2024-06-01 | End: 2024-06-01 | Stop reason: HOSPADM

## 2024-05-31 RX ORDER — LORAZEPAM 0.5 MG/1
1 TABLET ORAL EVERY 6 HOURS
Status: DISCONTINUED | OUTPATIENT
Start: 2024-06-01 | End: 2024-06-01 | Stop reason: HOSPADM

## 2024-05-31 RX ORDER — THIAMINE HYDROCHLORIDE 100 MG/ML
200 INJECTION, SOLUTION INTRAMUSCULAR; INTRAVENOUS EVERY 8 HOURS SCHEDULED
Status: DISCONTINUED | OUTPATIENT
Start: 2024-05-31 | End: 2024-06-01 | Stop reason: HOSPADM

## 2024-05-31 RX ORDER — LORAZEPAM 0.5 MG/1
2 TABLET ORAL EVERY 6 HOURS
Status: DISCONTINUED | OUTPATIENT
Start: 2024-05-31 | End: 2024-06-01 | Stop reason: HOSPADM

## 2024-05-31 RX ORDER — LORAZEPAM 0.5 MG/1
2 TABLET ORAL
Status: DISCONTINUED | OUTPATIENT
Start: 2024-05-31 | End: 2024-06-01 | Stop reason: HOSPADM

## 2024-05-31 RX ORDER — LORAZEPAM 0.5 MG/1
1 TABLET ORAL DAILY
Status: DISCONTINUED | OUTPATIENT
Start: 2024-06-04 | End: 2024-06-01 | Stop reason: HOSPADM

## 2024-05-31 RX ORDER — LORAZEPAM 2 MG/ML
1 INJECTION INTRAMUSCULAR
Status: DISCONTINUED | OUTPATIENT
Start: 2024-05-31 | End: 2024-06-01 | Stop reason: HOSPADM

## 2024-05-31 RX ADMIN — THIAMINE HYDROCHLORIDE 200 MG: 100 INJECTION, SOLUTION INTRAMUSCULAR; INTRAVENOUS at 22:45

## 2024-05-31 RX ADMIN — LORAZEPAM 2 MG: 0.5 TABLET ORAL at 22:15

## 2024-06-01 VITALS
DIASTOLIC BLOOD PRESSURE: 80 MMHG | WEIGHT: 140 LBS | HEART RATE: 83 BPM | TEMPERATURE: 97.8 F | OXYGEN SATURATION: 96 % | SYSTOLIC BLOOD PRESSURE: 153 MMHG | BODY MASS INDEX: 22.5 KG/M2 | RESPIRATION RATE: 29 BRPM | HEIGHT: 66 IN

## 2024-06-01 RX ORDER — GABAPENTIN 300 MG/1
300 CAPSULE ORAL ONCE
Status: COMPLETED | OUTPATIENT
Start: 2024-06-01 | End: 2024-06-01

## 2024-06-01 RX ORDER — SODIUM CHLORIDE 1 G/1
1 TABLET ORAL 3 TIMES DAILY
Qty: 45 TABLET | Refills: 0 | Status: SHIPPED | OUTPATIENT
Start: 2024-06-01 | End: 2024-06-16

## 2024-06-01 RX ORDER — GABAPENTIN 300 MG/1
CAPSULE ORAL
Qty: 11 CAPSULE | Refills: 0 | Status: SHIPPED | OUTPATIENT
Start: 2024-06-01 | End: 2024-06-06

## 2024-06-01 RX ADMIN — GABAPENTIN 300 MG: 300 CAPSULE ORAL at 00:35

## 2024-06-01 NOTE — DISCHARGE INSTRUCTIONS
Take gabapentin as prescribed to help with withdrawal.   Take 1 tablet every 6 hours on day 1.  Take 1 tablet every 8 hours on day 2.  Take 1 tablet every 12 hours on day 3.  Take 1 tablet at night on day 4.  Take 1 tablet at night on day 5.    If this is not successful in helping with withdrawal symptoms, return to emergency department for further evaluation.      Also provided prescription for sodium pills to help with low sodium levels found in emergency department.  This needs to be followed up with your primary care provider within the next 2 to 3 weeks for repeat labs

## 2024-06-01 NOTE — ED PROVIDER NOTES
EMERGENCY DEPARTMENT ENCOUNTER    Pt Name: Katelynn Ribeiro  MRN: [id number]  Pt :   1960  Room Number:    Date of encounter:  2024  PCP: Zarina Craig APRN  ED Provider: Artem Bowen MD    Historian: Patient and significant other      HPI:  Chief Complaint: Alcohol withdrawal        Context: Katelynn Ribeiro is a 64 y.o. female who presents to the ED c/o alcohol withdrawal.  Patient reportedly drinks approximately 50 beers per week.  Last night got intoxicated and had 2 falls at home.  Reportedly hit her head but denies loss of consciousness.  Did not want to come to the ER last night but family convinced her to come tonight to help with withdrawal.  Patient has been an alcoholic for a long time and has not tried to quit in several years.  He denies any other drug use.  Last drink was approximately last night.      PAST MEDICAL HISTORY  Past Medical History:   Diagnosis Date    Anxiety     Arthritis     Body piercing     BOTH EARS    Constipation     COPD (chronic obstructive pulmonary disease)     Depression     Elevated cholesterol     Esophagitis     Fracture, clavicle     GERD (gastroesophageal reflux disease)     History of bronchitis     History of fracture     HISTORY OF LEFT WRIST AS A CHILD, RIGHT WRIST 2018 AND REQUIRED SURGICAL INTERVENTION    History of tattoo     X1    Hyperlipidemia     Hypertension     Seasonal allergies     Wears dentures     FULL DENTURES - INSTRUCTED NO ADHESIVES THE DOS    Wears glasses          PAST SURGICAL HISTORY  Past Surgical History:   Procedure Laterality Date    COLONOSCOPY      COLONOSCOPY N/A 2018    Procedure: COLONOSCOPY WITH SNARE POLYPECTOMY;  Surgeon: Tan Nolan MD;  Location: [facility] ENDOSCOPY;  Service: Gastroenterology    MOUTH SURGERY      FULL EXTRACTION    ORIF ULNA/RADIUS FRACTURES Right 2018    Procedure: WRIST OPEN REDUCTION INTERNAL FIXATION DISTAL RADIUS RIGHT (SKELETAL DYNAMICS-IRIS  NISBET);  Surgeon: Grant Dougherty MD;  Location: [facility] OR;  Service: Orthopedics    ORIF WRIST FRACTURE Right 2018    Procedure: RIGHT THUMB EXTENSOR INDICUS PROPRIUS TO EXTENSOR POLLICUS LONGUS TENDON EXPLORATION;  Surgeon: rGant Dougherty MD;  Location: [facility];  Service: Orthopedics    UPPER GASTROINTESTINAL ENDOSCOPY           FAMILY HISTORY  Family History   Problem Relation Age of Onset    Cirrhosis Other          SOCIAL HISTORY  Social History     Socioeconomic History    Marital status: Single   Tobacco Use    Smoking status: Former     Current packs/day: 0.00     Average packs/day: 0.3 packs/day for 40.0 years (10.0 ttl pk-yrs)     Types: Cigarettes     Quit date:      Years since quittin.4    Smokeless tobacco: Never    Tobacco comments:     PT REPORTS SHE OCCASIONALLY SMOKES 2023   Vaping Use    Vaping status: Never Used   Substance and Sexual Activity    Alcohol use: Yes     Alcohol/week: 15.0 standard drinks of alcohol     Types: 15 Cans of beer per week     Comment: 15 cans of beer a day    Drug use: No    Sexual activity: Defer         ALLERGIES  Sulfa antibiotics        REVIEW OF SYSTEMS  Review of Systems     All systems reviewed and negative except for those discussed in HPI.       PHYSICAL EXAM    I have reviewed the triage vital signs and nursing notes.    ED Triage Vitals [24]   Temp Heart Rate Resp BP SpO2   97.8 °F (36.6 °C) 85 18 134/68 94 %      Temp src Heart Rate Source Patient Position BP Location FiO2 (%)   Oral Monitor Sitting Left arm --       Physical Exam    General:  Awake, alert, no acute distress  HEENT: Atraumatic, normocephalic, EOMI, PERRLA, mucous membranes moist  NECK:  Supple, atraumatic  Cardiovascular:  Regular rate, regular rhythm, no murmurs, rubs, or gallops.  Extremities well perfused   Respiratory:  Regular rate, clear lungs to auscultation bilaterally.  No rhonchi, rales, wheezing  Abdominal:  Soft, nondistended, nontender.  No  guarding or rebound.  No palpable masses  Extremity:  No visible bony abnormalities in all 4 extremities.  Full range of motion of all extremities.  Skin:  Warm and dry.  No rashes  Neuro:  AAOx3, GCS 15. Cranial nerves 2-12 grossly intact.  No focal strength or sensation deficits.  Psych:  Mood and affect appropriate.        LAB RESULTS  Recent Results (from the past 24 hour(s))   Green Top (Gel)    Collection Time: 05/31/24  9:07 PM   Result Value Ref Range    Extra Tube Hold for add-ons.    Lavender Top    Collection Time: 05/31/24  9:07 PM   Result Value Ref Range    Extra Tube hold for add-on    Gold Top - SST    Collection Time: 05/31/24  9:07 PM   Result Value Ref Range    Extra Tube Hold for add-ons.    Light Blue Top    Collection Time: 05/31/24  9:07 PM   Result Value Ref Range    Extra Tube Hold for add-ons.    Comprehensive Metabolic Panel    Collection Time: 05/31/24  9:07 PM    Specimen: Blood   Result Value Ref Range    Glucose 94 65 - 99 mg/dL    BUN 17 8 - 23 mg/dL    Creatinine 0.76 0.57 - 1.00 mg/dL    Sodium 125 (L) 136 - 145 mmol/L    Potassium 4.3 3.5 - 5.2 mmol/L    Chloride 88 (L) 98 - 107 mmol/L    CO2 24.5 22.0 - 29.0 mmol/L    Calcium 9.4 8.6 - 10.5 mg/dL    Total Protein 7.4 6.0 - 8.5 g/dL    Albumin 4.5 3.5 - 5.2 g/dL    ALT (SGPT) 13 1 - 33 U/L    AST (SGOT) 20 1 - 32 U/L    Alkaline Phosphatase 84 39 - 117 U/L    Total Bilirubin 0.3 0.0 - 1.2 mg/dL    Globulin 2.9 gm/dL    A/G Ratio 1.6 g/dL    BUN/Creatinine Ratio 22.4 7.0 - 25.0    Anion Gap 12.5 5.0 - 15.0 mmol/L    eGFR 87.1 >60.0 mL/min/1.73   Acetaminophen Level    Collection Time: 05/31/24  9:07 PM    Specimen: Blood   Result Value Ref Range    Acetaminophen <5.0 0.0 - 30.0 mcg/mL   Ethanol    Collection Time: 05/31/24  9:07 PM    Specimen: Blood   Result Value Ref Range    Ethanol <10 0 - 10 mg/dL    Ethanol % <0.010 %   Salicylate Level    Collection Time: 05/31/24  9:07 PM    Specimen: Blood   Result Value Ref Range     Salicylate <0.3 <=30.0 mg/dL   CBC Auto Differential    Collection Time: 05/31/24  9:07 PM    Specimen: Blood   Result Value Ref Range    WBC 9.20 3.40 - 10.80 10*3/mm3    RBC 3.35 (L) 3.77 - 5.28 10*6/mm3    Hemoglobin 11.6 (L) 12.0 - 15.9 g/dL    Hematocrit 32.7 (L) 34.0 - 46.6 %    MCV 97.6 (H) 79.0 - 97.0 fL    MCH 34.6 (H) 26.6 - 33.0 pg    MCHC 35.5 31.5 - 35.7 g/dL    RDW 11.8 (L) 12.3 - 15.4 %    RDW-SD 42.5 37.0 - 54.0 fl    MPV 8.9 6.0 - 12.0 fL    Platelets 273 140 - 450 10*3/mm3    Neutrophil % 62.6 42.7 - 76.0 %    Lymphocyte % 22.2 19.6 - 45.3 %    Monocyte % 14.2 (H) 5.0 - 12.0 %    Eosinophil % 0.3 0.3 - 6.2 %    Basophil % 0.3 0.0 - 1.5 %    Immature Grans % 0.4 0.0 - 0.5 %    Neutrophils, Absolute 5.75 1.70 - 7.00 10*3/mm3    Lymphocytes, Absolute 2.04 0.70 - 3.10 10*3/mm3    Monocytes, Absolute 1.31 (H) 0.10 - 0.90 10*3/mm3    Eosinophils, Absolute 0.03 0.00 - 0.40 10*3/mm3    Basophils, Absolute 0.03 0.00 - 0.20 10*3/mm3    Immature Grans, Absolute 0.04 0.00 - 0.05 10*3/mm3    nRBC 0.0 0.0 - 0.2 /100 WBC   COVID-19 and FLU A/B PCR, 1 HR TAT - Swab, Nasopharynx    Collection Time: 05/31/24  9:10 PM    Specimen: Nasopharynx; Swab   Result Value Ref Range    COVID19 Not Detected Not Detected - Ref. Range    Influenza A PCR Not Detected Not Detected    Influenza B PCR Not Detected Not Detected   Urine Drug Screen - Urine, Clean Catch    Collection Time: 05/31/24  9:58 PM    Specimen: Urine, Clean Catch   Result Value Ref Range    THC, Screen, Urine Negative Negative    Phencyclidine (PCP), Urine Negative Negative    Cocaine Screen, Urine Negative Negative    Methamphetamine, Ur Negative Negative    Opiate Screen Negative Negative    Amphetamine Screen, Urine Negative Negative    Benzodiazepine Screen, Urine Negative Negative    Tricyclic Antidepressants Screen Negative Negative    Methadone Screen, Urine Negative Negative    Barbiturates Screen, Urine Negative Negative    Oxycodone Screen, Urine  Negative Negative    Buprenorphine, Screen, Urine Negative Negative   Urinalysis With Microscopic If Indicated (No Culture) - Urine, Clean Catch    Collection Time: 05/31/24  9:58 PM    Specimen: Urine, Clean Catch   Result Value Ref Range    Color, UA Yellow Yellow, Straw    Appearance, UA Clear Clear    pH, UA 7.0 5.0 - 8.0    Specific Gravity, UA 1.011 1.005 - 1.030    Glucose, UA Negative Negative    Ketones, UA Negative Negative    Bilirubin, UA Negative Negative    Blood, UA Negative Negative    Protein, UA Negative Negative    Leuk Esterase, UA Trace (A) Negative    Nitrite, UA Negative Negative    Urobilinogen, UA 0.2 E.U./dL 0.2 - 1.0 E.U./dL   Fentanyl, Urine - Urine, Clean Catch    Collection Time: 05/31/24  9:58 PM    Specimen: Urine, Clean Catch   Result Value Ref Range    Fentanyl, Urine Negative Negative   Urinalysis, Microscopic Only - Urine, Clean Catch    Collection Time: 05/31/24  9:58 PM    Specimen: Urine, Clean Catch   Result Value Ref Range    RBC, UA None Seen None Seen, 0-2 /HPF    WBC, UA 0-2 None Seen, 0-2 /HPF    Bacteria, UA 1+ (A) None Seen /HPF    Squamous Epithelial Cells, UA 3-6 (A) None Seen, 0-2 /HPF    Hyaline Casts, UA 0-2 None Seen /LPF    Methodology Manual Light Microscopy        If labs were ordered, I independently reviewed the results and considered them in treating the patient.        RADIOLOGY  CT Head Without Contrast    Result Date: 5/31/2024  FINAL REPORT TECHNIQUE: Routine axial images through the head were obtained without contrast. CLINICAL HISTORY: head injury while drinking, denies pain, found on floor face down by family FINDINGS: The ventricles are normal.  There is no mass or other abnormal hypodensity.  There is no shift of midline structures.  There is no intracranial hemorrhage.  No acute sinus or osseous abnormality is seen.     Unremarkable. Authenticated and Electronically Signed by Rigo Torres M.D. on 05/31/2024 10:24:32 PM     I ordered and independently  reviewed the above noted radiographic studies.     See radiologist's dictation for official interpretation.        PROCEDURES    Procedures    ECG 12 Lead QT Measurement    (Results Pending)       MEDICATIONS GIVEN IN ER    Medications   sodium chloride 0.9 % flush 10 mL (has no administration in time range)   sodium chloride 0.9 % flush 10 mL (has no administration in time range)   Magnesium Standard Dose Replacement - Follow Nurse / BPA Driven Protocol (has no administration in time range)   thiamine (B-1) injection 200 mg (200 mg Intravenous Given 5/31/24 2245)     Followed by   thiamine (VITAMIN B-1) tablet 100 mg (has no administration in time range)   folic acid (FOLVITE) tablet 1 mg (has no administration in time range)   LORazepam (ATIVAN) tablet 2 mg (2 mg Oral Given 5/31/24 2215)     Followed by   LORazepam (ATIVAN) tablet 1 mg (has no administration in time range)     Followed by   LORazepam (ATIVAN) tablet 1 mg (has no administration in time range)     Followed by   LORazepam (ATIVAN) tablet 1 mg (has no administration in time range)   LORazepam (ATIVAN) tablet 1 mg (has no administration in time range)     Or   LORazepam (ATIVAN) injection 1 mg (has no administration in time range)     Or   LORazepam (ATIVAN) tablet 2 mg (has no administration in time range)     Or   LORazepam (ATIVAN) injection 2 mg (has no administration in time range)     Or   LORazepam (ATIVAN) injection 2 mg (has no administration in time range)     Or   LORazepam (ATIVAN) injection 2 mg (has no administration in time range)         MEDICAL DECISION MAKING, PROGRESS, and CONSULTS    All labs, if obtained, have been independently reviewed by me.  All radiology studies, if obtained, have been reviewed by me and the radiologist dictating the report.  All EKG's, if obtained, have been independently viewed and interpreted by me.      Discussion below represents my analysis of pertinent findings related to patient's condition,  differential diagnosis, treatment plan and final disposition.    Katelynn Ribeiro is a 64 y.o. female who presents to the ED c/o alcohol withdrawal.  Hemodynamically stable nontoxic in appearance upon arrival.  Starting to have mild withdrawal symptoms as last drink was approximately 18 hours ago.  Started on CIWA protocol.  Labs obtained for medical assessment prior to behavioral health evaluation.  EKG obtained which was personally reviewed and interpreted showing normal sinus rhythm with rate of 80 and no evidence of acute ischemic changes or significant arrhythmia.  Due to patient's fall while intoxicated last night with head trauma, CT of head obtained which showed no acute intracranial injury, skull fracture or bleed.  Labs show hyponatremia of 125 consistent with patient's chronic alcohol abuse of beer.  No other emergent abnormalities.  Patient to be started on sodium tablets daily to help slowly raise chronic hyponatremia.  Remained stable with well control of withdrawal symptoms while in emergency department.  Patient evaluated by behavioral health who recommended outpatient treatment for alcohol withdrawal as patient declined placement at rehab facility for withdrawal.  Patient given prescription for gabapentin and was advised on return precautions.  Patient discharged.                                 Orders placed during this visit:  Orders Placed This Encounter   Procedures    COVID-19 and FLU A/B PCR, 1 HR TAT - Swab, Nasopharynx    CT Head Without Contrast    [place] Draw    Comprehensive Metabolic Panel    Acetaminophen Level    Ethanol    Urine Drug Screen - Urine, Clean Catch    Salicylate Level    Urinalysis With Microscopic If Indicated (No Culture) - Urine, Clean Catch    CBC Auto Differential    Fentanyl, Urine - Urine, Clean Catch    Urinalysis, Microscopic Only - Urine, Clean Catch    Vital Signs    Continuous Pulse Oximetry    Obtain Baseline Clinical [place] Withdrawal Assessment -  Ar (CIWA-Ar), Sedation Scale & Vital Signs    Clinical [place] Withdrawal Assessment (CIWA-Ar)    If CIWA-Ar Score Less Than 8 For 3 Consecutive Assessments, Monitor Every 4 Hours & Discontinue Assessment When CIWA-Ar Less Than 8 for 24 Hours    Obtain Pre & Post Sedation Scores With Every Sedative Dose - Hold For POSS Greater Than 2 or RASS of -3 or Less    Notify Provider - Withdrawal    Notify Provider of Abnormal Lab Results    Notify Provider - Vitals    Inpatient Behavioral Health Consult    ECG 12 Lead QT Measurement    Insert peripheral IV    Insert Peripheral IV    Seizure Precautions    Safety Precautions    Green Top (Gel)    Lavender Top    Gold Top - SST    Light Blue Top    CBC & Differential         ED Course:    Consultants:                  Shared Decision Making:  After my consideration of clinical presentation and any laboratory/radiology studies obtained, I discussed the findings with the patient/patient representative who is in agreement with the treatment plan and the final disposition.   Risks and benefits of discharge and/or observation/admission were discussed.      AS OF 00:13 EDT VITALS:    BP - 153/80  HR - 83  TEMP - 97.8 °F (36.6 °C) (Oral)  O2 SATS - 96%                  DIAGNOSIS  Final diagnoses:   Alcohol withdrawal syndrome without complication   Hyponatremia         DISPOSITION  Discharge      Please note that portions of this document were completed with voice recognition software.        Artem Bowen MD  06/01/24 0136
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
## 2024-07-01 ENCOUNTER — OFFICE VISIT (OUTPATIENT)
Dept: NEUROLOGY | Facility: CLINIC | Age: 64
End: 2024-07-01
Payer: MEDICARE

## 2024-07-01 VITALS
DIASTOLIC BLOOD PRESSURE: 68 MMHG | RESPIRATION RATE: 14 BRPM | TEMPERATURE: 98.2 F | HEIGHT: 66 IN | SYSTOLIC BLOOD PRESSURE: 126 MMHG | BODY MASS INDEX: 22.61 KG/M2 | OXYGEN SATURATION: 96 % | HEART RATE: 87 BPM

## 2024-07-01 DIAGNOSIS — S09.90XA CLOSED HEAD INJURY, INITIAL ENCOUNTER: ICD-10-CM

## 2024-07-01 DIAGNOSIS — G47.10 HYPERSOMNIA: ICD-10-CM

## 2024-07-01 DIAGNOSIS — G43.909 ACUTE MIGRAINE: ICD-10-CM

## 2024-07-01 DIAGNOSIS — G43.711 INTRACTABLE CHRONIC MIGRAINE WITHOUT AURA AND WITH STATUS MIGRAINOSUS: Primary | ICD-10-CM

## 2024-07-01 PROCEDURE — 1160F RVW MEDS BY RX/DR IN RCRD: CPT | Performed by: NURSE PRACTITIONER

## 2024-07-01 PROCEDURE — 1159F MED LIST DOCD IN RCRD: CPT | Performed by: NURSE PRACTITIONER

## 2024-07-01 PROCEDURE — 99204 OFFICE O/P NEW MOD 45 MIN: CPT | Performed by: NURSE PRACTITIONER

## 2024-07-01 RX ORDER — TOPIRAMATE 25 MG/1
25 TABLET ORAL 2 TIMES DAILY
Qty: 60 TABLET | Refills: 2 | Status: SHIPPED | OUTPATIENT
Start: 2024-07-01

## 2024-07-01 NOTE — PROGRESS NOTES
New Patient Office Visit      Patient Name: Katelynn Ribeiro  : 1960   MRN: [id number]     Referring Physician: Zarina Craig APRN    Chief Complaint:    Chief Complaint   Patient presents with    Establish Care     HA; started having HA in February following a fall. Pt reports 30 HA days       History of Present Illness: Katelynn Ribeiro is a 64 y.o. female who is here today to establish care with Neurology.  She has never been seen before in Neurology. She was referred to us from PCP (Rafa) for daily HA.     She describes her HA as a dull constant ache that begins to her right lateral skull and will radiate to her entire head and down into her C-spine. She likes to take OTC Nsaids PRN and this will help with the pain. She has tried heat and ice also. She cannot sleep well and the pain in her head will wake her up. No N/V. No light or sound sensitivity. HA can last > 24 hours.  LUIS will wake her up in AM. MDM . She is taking BB (Metoprolol), Losartan, Nifedipine for HTN and these are managed by her PCP. She has never suffered from HA in the past. Previously tried: gabapentin, magnesium, Lamictal, sertraline, losartan, metoprolol, Nsaids. Did fall  and did not seek medical attention; she did have a goose egg on the right side of her head. Did have LOC for < 5 seconds. This was onset of HA; has had daily persistent HA since this time.      She was seen in ER on 24 for ETOH and Hyponatremia from a fall. She feels this was the onset of her HAs.  Never had HA in her youth.     She reports having a hard time falling asleep and staying asleep. Pain in her head wakes her up. She cannot stay asleep. + excessive AM fatigue in AM     SOCIAL: . Lives with boyfriend, Yonathan. Three adult daughters. Seven grandchildren. From [facility]. Former . HS graduate. Some college. Former tobacco use. No recreational drugs or cannabis. Social ETOH with 4-5 beers per  day.     [facility] Neuro: NONE    Recent Imaging:     CT Head WO 05/24 - unremarkable  CT Head WO 05/24 + Mild amount of periventricular white matter ischemic changes.     Pertinent Medical History: Oxygen Dependant, COPD, Depression, Bipolar Disorder, GERD, HTN, HL, Anxiety, Seasonal Allergies, Fall 02/2024 s/p concussion     Subjective      Review of Systems:   Review of Systems   Constitutional: Negative.    HENT: Negative.     Eyes: Negative.    Respiratory: Negative.     Cardiovascular: Negative.    Gastrointestinal: Negative.    Endocrine: Negative.    Genitourinary: Negative.    Musculoskeletal: Negative.    Skin: Negative.    Allergic/Immunologic: Negative.    Neurological:  Positive for headache.   Hematological: Negative.    Psychiatric/Behavioral: Negative.     All other systems reviewed and are negative.      Past Medical History:   Past Medical History:   Diagnosis Date    Anxiety     Arthritis     Body piercing     BOTH EARS    Cluster headache 3/1/24    daily headaches    Constipation     COPD (chronic obstructive pulmonary disease)     Depression     Difficulty walking     Elevated cholesterol     Esophagitis     Fracture, clavicle     GERD (gastroesophageal reflux disease)     Head injury 2/18/24    probable concussion    History of bronchitis     History of fracture     HISTORY OF LEFT WRIST AS A CHILD, RIGHT WRIST AUGUST 2018 AND REQUIRED SURGICAL INTERVENTION    History of tattoo     X1    Hyperlipidemia     Hypertension     Migraine 3/1/24    daily    Seasonal allergies     Wears dentures     FULL DENTURES - INSTRUCTED NO ADHESIVES THE DOS    Wears glasses        Past Surgical History:   Past Surgical History:   Procedure Laterality Date    COLONOSCOPY      COLONOSCOPY N/A 04/11/2018    Procedure: COLONOSCOPY WITH SNARE POLYPECTOMY;  Surgeon: Tan Nolan MD;  Location: [facility] ENDOSCOPY;  Service: Gastroenterology    MOUTH SURGERY      FULL EXTRACTION    ORIF ULNA/RADIUS FRACTURES Right  2018    Procedure: WRIST OPEN REDUCTION INTERNAL FIXATION DISTAL RADIUS RIGHT (SKELETAL DYNAMICS-IRIS HUTTON);  Surgeon: Grant Dougherty MD;  Location: [facility] OR;  Service: Orthopedics    ORIF WRIST FRACTURE Right 2018    Procedure: RIGHT THUMB EXTENSOR INDICUS PROPRIUS TO EXTENSOR POLLICUS LONGUS TENDON EXPLORATION;  Surgeon: Grant Dougherty MD;  Location: [facility] OR;  Service: Orthopedics    UPPER GASTROINTESTINAL ENDOSCOPY         Family History:   Family History   Problem Relation Age of Onset    Cirrhosis Other        Social History:   Social History     Socioeconomic History    Marital status: Single   Tobacco Use    Smoking status: Former     Current packs/day: 0.00     Average packs/day: 0.3 packs/day for 31.7 years (7.9 ttl pk-yrs)     Types: Cigarettes     Start date:      Quit date: 2023     Years since quittin.1    Smokeless tobacco: Never    Tobacco comments:     PT REPORTS SHE OCCASIONALLY SMOKES 2023   Vaping Use    Vaping status: Never Used   Substance and Sexual Activity    Alcohol use: Yes     Alcohol/week: 4.0 standard drinks of alcohol     Types: 4 Cans of beer per week     Comment: about 4-5 a week    Drug use: No    Sexual activity: Yes     Partners: Male       Medications:     Current Outpatient Medications:     albuterol (PROVENTIL) (2.5 MG/3ML) 0.083% nebulizer solution, Take 2.5 mg by nebulization 4 (Four) Times a Day., Disp: , Rfl:     albuterol sulfate  (90 Base) MCG/ACT inhaler, Inhale 1 puff See Admin Instructions. Inhale 2 puffs by mouth every 4 to 6 hours as needed, Disp: 16 g, Rfl: 5    atorvastatin (LIPITOR) 20 MG tablet, Take 1 tablet by mouth Daily., Disp: , Rfl:     Budeson-Glycopyrrol-Formoterol (Breztri Aerosphere) 160-9-4.8 MCG/ACT aerosol inhaler, Inhale 2 puffs 2 (Two) Times a Day., Disp: 32.1 each, Rfl: 3    busPIRone (BUSPAR) 7.5 MG tablet, Take 1 tablet by mouth Every 12 (Twelve) Hours., Disp: , Rfl:     lamoTRIgine (LaMICtal) 100  "MG tablet, Take 1 tablet by mouth Every 12 (Twelve) Hours., Disp: , Rfl:     losartan (COZAAR) 50 MG tablet, Take 1 tablet by mouth Every 12 (Twelve) Hours., Disp: , Rfl:     metoprolol succinate XL (TOPROL-XL) 50 MG 24 hr tablet, Take 1 tablet by mouth Daily., Disp: , Rfl: 11    Multiple Vitamin (MULTI-VITAMIN DAILY PO), Take 1 tablet by mouth Daily., Disp: , Rfl:     NIFEdipine XL (PROCARDIA XL) 30 MG 24 hr tablet, Take 1 tablet by mouth Daily., Disp: , Rfl:     pantoprazole (PROTONIX) 20 MG EC tablet, Take 1 tablet by mouth Daily., Disp: , Rfl:     sertraline (ZOLOFT) 100 MG tablet, Take 2 tablets by mouth Daily., Disp: , Rfl:     traZODone (DESYREL) 150 MG tablet, Take 1 tablet by mouth every night at bedtime., Disp: , Rfl:     aspirin 81 MG chewable tablet, Chew 1 tablet Daily. (Patient not taking: Reported on 7/1/2024), Disp: , Rfl:     cetirizine (zyrTEC) 10 MG tablet, TAKE 1 TABLET BY MOUTH EVERY DAY (Patient not taking: Reported on 7/1/2024), Disp: 90 tablet, Rfl: 1    gabapentin (NEURONTIN) 300 MG capsule, Take 1 capsule by mouth 4 (Four) Times a Day for 1 day, THEN 1 capsule 3 (Three) Times a Day for 1 day, THEN 1 capsule 2 (Two) Times a Day for 1 day, THEN 1 capsule Every Night for 1 day, THEN 1 capsule Every Night for 1 day., Disp: 11 capsule, Rfl: 0    topiramate (Topamax) 25 MG tablet, Take 1 tablet by mouth 2 (Two) Times a Day., Disp: 60 tablet, Rfl: 2    ubrogepant (Ubrelvy) 100 MG tablet, Take 1 tablet by mouth As Needed (Migraine)., Disp: 9 tablet, Rfl: 3    Allergies:   Allergies   Allergen Reactions    Sulfa Antibiotics Hives and Other (See Comments)     ITCHING ,FLUSHING, SEVERE LOWER BACK PAIN  Flushing, kidney pain       Objective     Physical Exam:  Vital Signs:   Vitals:    07/01/24 1431   BP: 126/68   BP Location: Right arm   Patient Position: Sitting   Cuff Size: Adult   Pulse: 87   Resp: 14   Temp: 98.2 °F (36.8 °C)   TempSrc: Infrared   SpO2: 96%   Height: 167.6 cm (65.98\")   PainSc:   " 7   PainLoc: Head     Body mass index is 22.61 kg/m².     Physical Exam  Vitals and nursing note reviewed.   Constitutional:       Appearance: Normal appearance. She is not toxic-appearing.   HENT:      Head: Normocephalic.      Nose: Nose normal.   Eyes:      Extraocular Movements: Extraocular movements intact.      Conjunctiva/sclera: Conjunctivae normal.   Pulmonary:      Comments: Oxygen dependence  Musculoskeletal:      Cervical back: Normal range of motion and neck supple.   Skin:     General: Skin is warm and dry.      Capillary Refill: Capillary refill takes less than 2 seconds.   Neurological:      General: No focal deficit present.      Mental Status: She is alert and oriented to person, place, and time.      Cranial Nerves: Cranial nerves 2-12 are intact.      Coordination: Finger-Nose-Finger Test abnormal and Romberg Test abnormal.      Deep Tendon Reflexes:      Reflex Scores:       Tricep reflexes are 2+ on the right side and 2+ on the left side.       Bicep reflexes are 2+ on the right side and 2+ on the left side.       Patellar reflexes are 2+ on the right side and 2+ on the left side.  Psychiatric:         Mood and Affect: Mood normal.         Speech: Speech normal.         Behavior: Behavior normal.         Neurologic Exam     Mental Status   Oriented to person, place, and time.   Speech: speech is normal   Level of consciousness: alert  Knowledge: good.     Cranial Nerves   Cranial nerves II through XII intact.     Motor Exam   Muscle bulk: normal  Overall muscle tone: normal  Right arm tone: normal  Left arm tone: normal  Right arm pronator drift: absent  Left arm pronator drift: absent  Right leg tone: normal  Left leg tone: normal    Strength   Right biceps: 5/5  Left biceps: 5/5  Right triceps: 5/5  Left triceps: 5/5  Right quadriceps: 5/5  Left quadriceps: 5/5  Right hamstrin/5  Left hamstrin/5    Sensory Exam   Light touch normal.     Gait, Coordination, and Reflexes     Gait  Gait:  shuffling    Coordination   Romberg: positive  Finger to nose coordination: abnormal    Tremor   Resting tremor: present    Reflexes   Right biceps: 2+  Left biceps: 2+  Right triceps: 2+  Left triceps: 2+  Right patellar: 2+  Left patellar: 2+  Right Lopez: absent  Left Lopez: absentFine amplitude tremor noted to bilateral hands at rest; tremor does not intensify as she nears target.  She can move from sitting to standing x 1 attempt with use of hands.  Shuffling gait.       PHQ-9 Total Score:       Assessment / Plan      Assessment/Plan:   Diagnoses and all orders for this visit:    1. Intractable chronic migraine without aura and with status migrainosus (Primary)  -     MRI Brain With & Without Contrast; Future  -     topiramate (Topamax) 25 MG tablet; Take 1 tablet by mouth 2 (Two) Times a Day.  Dispense: 60 tablet; Refill: 2    2. Acute migraine  -     ubrogepant (Ubrelvy) 100 MG tablet; Take 1 tablet by mouth As Needed (Migraine).  Dispense: 9 tablet; Refill: 3    3. Hypersomnia  -     Home Sleep Study; Future    4. Closed head injury, initial encounter  -     MRI Brain With & Without Contrast; Future         Follow Up:   Return in about 3 months (around 10/1/2024), or if symptoms worsen or fail to improve.    Anticipatory guidance and safety reviewed  Patient education provided  MRI Brain W/WO r/o demyelinating disease  Home sleep study  Begin Ubrelvy 100 mg PRN (ABORTIVE); SE reviewed. Samples and coupons provided  Encouraged to avoid Nsaids > 2 times per week for MOH  Begin Topamax 25 mg BID; SE reviewed (PREVENTION).  Encouraged HA journaling for trigger identification and prevention    RTC PRN or within 12 weeks or sooner with issues     THEODORE Lopez   Neurology and Sleep Medicine

## 2024-07-02 ENCOUNTER — PATIENT ROUNDING (BHMG ONLY) (OUTPATIENT)
Dept: NEUROLOGY | Facility: CLINIC | Age: 64
End: 2024-07-02
Payer: MEDICARE

## 2024-07-02 NOTE — PROGRESS NOTES
July 2, 2024      My name is Arianna Morris      I am  with [facility]  [address]  [place] [address] [phone].      I am reaching out to you to officially welcome you to our practice and ask about your recent visit.     Tell me about your visit with us. What things went well?         We're always looking for ways to make our patients' experiences even better. Do you have recommendations on ways we may improve?      Overall were you satisfied with your first visit to our practice?        I would appreciate you taking the time to complete the survey. Your feedback is greatly appreciated! Please reply directly to this e-mail should you have any questions or concerns.      Thank you, and have a great day.

## 2024-07-05 ENCOUNTER — TELEPHONE (OUTPATIENT)
Dept: PULMONOLOGY | Facility: CLINIC | Age: 64
End: 2024-07-05

## 2024-07-05 NOTE — TELEPHONE ENCOUNTER
Caller: Katelynn Ribeiro    Relationship to patient: Self    Best call back number: [phone]     Patient is needing: PT MEDICARE PLAN REQUIRING AUTHORIZATION/RE-CERTIFICATION FOR PORTABLE OXYGEN CONCENTRATOR SENT TO [facility] PHONE NUMBER: [phone]. PLEASE CALL TO VERIFY WITH PATIENT ONCE AUTH SUBMITTED.

## 2024-07-18 DIAGNOSIS — J43.2 CENTRILOBULAR EMPHYSEMA: Primary | ICD-10-CM

## 2024-07-22 ENCOUNTER — HOSPITAL ENCOUNTER (OUTPATIENT)
Dept: SLEEP MEDICINE | Facility: HOSPITAL | Age: 64
Discharge: HOME | End: 2024-07-22
Admitting: NURSE PRACTITIONER
Payer: MEDICARE

## 2024-07-22 DIAGNOSIS — G47.10 HYPERSOMNIA: ICD-10-CM

## 2024-07-22 PROCEDURE — 95806 SLEEP STUDY UNATT&RESP EFFT: CPT

## 2024-07-26 PROCEDURE — 95806 SLEEP STUDY UNATT&RESP EFFT: CPT | Performed by: INTERNAL MEDICINE

## 2024-07-29 ENCOUNTER — HOSPITAL ENCOUNTER (OUTPATIENT)
Dept: MRI IMAGING | Facility: HOSPITAL | Age: 64
Discharge: HOME | End: 2024-07-29
Admitting: NURSE PRACTITIONER
Payer: MEDICARE

## 2024-07-29 DIAGNOSIS — S09.90XA CLOSED HEAD INJURY, INITIAL ENCOUNTER: ICD-10-CM

## 2024-07-29 DIAGNOSIS — G43.711 INTRACTABLE CHRONIC MIGRAINE WITHOUT AURA AND WITH STATUS MIGRAINOSUS: ICD-10-CM

## 2024-07-29 PROCEDURE — A9577 INJ MULTIHANCE: HCPCS | Performed by: NURSE PRACTITIONER

## 2024-07-29 PROCEDURE — 0 GADOBENATE DIMEGLUMINE 529 MG/ML SOLUTION: Performed by: NURSE PRACTITIONER

## 2024-07-29 PROCEDURE — 70553 MRI BRAIN STEM W/O & W/DYE: CPT

## 2024-07-29 RX ADMIN — GADOBENATE DIMEGLUMINE 15 ML: 529 INJECTION, SOLUTION INTRAVENOUS at 17:24

## 2024-07-31 ENCOUNTER — TELEPHONE (OUTPATIENT)
Dept: NEUROLOGY | Facility: CLINIC | Age: 64
End: 2024-07-31

## 2024-07-31 NOTE — TELEPHONE ENCOUNTER
Caller: Katelynn Ribeiro Sola    Relationship: Self    Best call back number:   Telephone Information:   Mobile [phone]     Caller requesting test results: PT    What test was performed: MRI BRAIN W/WO    When was the test performed: 7-29-24    Where was the test performed: [facility]    Additional notes: PT SEEN THE FOLLOWING RESULTS ON MYCHART AND HAS ADDITIONAL QUESTIONS/CONCERNS . PLEASE REVIEW AND ADVISE         MRI Brain With & Without Contrast: Result Notes     Alla Davidson, APRN  7/31/2024  9:05 AM EDT        Please call Katelynn with her recent MRI results; her MRI showed mild-to-moderate diffuse cortical atrophy. There is  mild-to-moderate abnormal signal identified throughout the periventricular and subcortical white matter bilaterally. These findings are nonspecific but probably related to chronic ischemia.  I look forward to discussing these results with her further at her follow-up appointment.

## 2024-07-31 NOTE — TELEPHONE ENCOUNTER
Called and spoke to patient about results. She requested an earlier appt so we scheduled a [facility] appt to go over results.

## 2024-08-12 ENCOUNTER — TELEMEDICINE (OUTPATIENT)
Dept: NEUROLOGY | Facility: CLINIC | Age: 64
End: 2024-08-12
Payer: MEDICARE

## 2024-08-12 ENCOUNTER — TELEPHONE (OUTPATIENT)
Dept: NEUROLOGY | Facility: CLINIC | Age: 64
End: 2024-08-12

## 2024-08-12 DIAGNOSIS — G43.909 ACUTE MIGRAINE: ICD-10-CM

## 2024-08-12 DIAGNOSIS — G43.711 INTRACTABLE CHRONIC MIGRAINE WITHOUT AURA AND WITH STATUS MIGRAINOSUS: Primary | ICD-10-CM

## 2024-08-12 DIAGNOSIS — G47.33 OSA (OBSTRUCTIVE SLEEP APNEA): ICD-10-CM

## 2024-08-12 DIAGNOSIS — R26.89 BALANCE DISORDER: ICD-10-CM

## 2024-08-12 PROCEDURE — 1159F MED LIST DOCD IN RCRD: CPT | Performed by: NURSE PRACTITIONER

## 2024-08-12 PROCEDURE — 1160F RVW MEDS BY RX/DR IN RCRD: CPT | Performed by: NURSE PRACTITIONER

## 2024-08-12 PROCEDURE — 99214 OFFICE O/P EST MOD 30 MIN: CPT | Performed by: NURSE PRACTITIONER

## 2024-08-12 RX ORDER — TOPIRAMATE 25 MG/1
25 TABLET ORAL 2 TIMES DAILY
Qty: 60 TABLET | Refills: 2 | Status: SHIPPED | OUTPATIENT
Start: 2024-08-12

## 2024-08-12 NOTE — PROGRESS NOTES
Follow Up Office Visit      Patient Name: Katelynn Ribeiro  : 1960   MRN: [id number]     Chief Complaint:    Chief Complaint   Patient presents with    Migraine       History of Present Illness: Katelynn Ribeiro is a 64 y.o. female who is here today to follow up with neurology for migraine headache, and hypersomnia.  She was last seen in clinic  (C[facility]).  This is a telehealth encounter. Her daughter is joining us via [employer] also and assisting with history.     She was ordered a home sleep study and sent for MRI brain imaging.  She was started on topiramate 25 mg twice daily and Ubrelvy 100 mg as needed for migraine prevention and abortive and reports good tolerance and compliance with medication therapy.  Ubrelvy will terminate a migraine; she did have issues at the pharmacy and has not received prescription.  MDM 0 out of 30.  She has been taking topiramate and is very happy with results!  She is open to AutoPap therapy and has in a appt with [facility]  for AutoPap therapy set up.  She denies memory problems but sometimes will struggle with short-term memory. Reports having some balance disturbance and would like order to PT; onset > 1 year ago.     Taken from previous encounter:    She describes her HA as a dull constant ache that begins to her right lateral skull and will radiate to her entire head and down into her C-spine. No N/V. No light or sound sensitivity. HA can last > 24 hours. Previously tried: gabapentin, magnesium, Lamictal, sertraline, losartan, metoprolol, Nsaids, topamax, Ubrelvy.     Recent Imaging:     MRI Brain W/WO  +  Mild-to-moderate diffuse cortical atrophy is present. There is mild-to-moderate abnormal signal identified throughout the periventricular and subcortical white matter bilaterally. These findings are nonspecific but probably related to chronic ischemia.  Home Sleep Study  + Mild ANTONIETTA with AHI 7%  CT Head WO  - unremarkable  CT  Head WO 05/24 + Mild amount of periventricular white matter ischemic changes.      Pertinent Medical History: Oxygen Dependant, COPD, Depression, Bipolar Disorder, GERD, HTN, HL, Anxiety, Seasonal Allergies, Fall 02/2024 s/p concussion     Subjective      Review of Systems:   Review of Systems   Constitutional:  Positive for fatigue.   Neurological:  Positive for headache.   Psychiatric/Behavioral:  Positive for sleep disturbance.        I have reviewed and the following portions of the patient's history were updated as appropriate: past family history, past medical history, past social history, past surgical history and problem list.    Medications:     Current Outpatient Medications:     topiramate (Topamax) 25 MG tablet, Take 1 tablet by mouth 2 (Two) Times a Day., Disp: 60 tablet, Rfl: 2    ubrogepant (Ubrelvy) 100 MG tablet, Take 1 tablet by mouth As Needed (Migraine)., Disp: 9 tablet, Rfl: 3    albuterol (PROVENTIL) (2.5 MG/3ML) 0.083% nebulizer solution, Take 2.5 mg by nebulization 4 (Four) Times a Day., Disp: , Rfl:     albuterol sulfate  (90 Base) MCG/ACT inhaler, Inhale 1 puff See Admin Instructions. Inhale 2 puffs by mouth every 4 to 6 hours as needed, Disp: 16 g, Rfl: 5    aspirin 81 MG chewable tablet, Chew 1 tablet Daily. (Patient not taking: Reported on 7/1/2024), Disp: , Rfl:     atorvastatin (LIPITOR) 20 MG tablet, Take 1 tablet by mouth Daily., Disp: , Rfl:     Budeson-Glycopyrrol-Formoterol (Breztri Aerosphere) 160-9-4.8 MCG/ACT aerosol inhaler, Inhale 2 puffs 2 (Two) Times a Day., Disp: 32.1 each, Rfl: 3    busPIRone (BUSPAR) 7.5 MG tablet, Take 1 tablet by mouth Every 12 (Twelve) Hours., Disp: , Rfl:     cetirizine (zyrTEC) 10 MG tablet, TAKE 1 TABLET BY MOUTH EVERY DAY (Patient not taking: Reported on 7/1/2024), Disp: 90 tablet, Rfl: 1    gabapentin (NEURONTIN) 300 MG capsule, Take 1 capsule by mouth 4 (Four) Times a Day for 1 day, THEN 1 capsule 3 (Three) Times a Day for 1 day, THEN 1  capsule 2 (Two) Times a Day for 1 day, THEN 1 capsule Every Night for 1 day, THEN 1 capsule Every Night for 1 day., Disp: 11 capsule, Rfl: 0    lamoTRIgine (LaMICtal) 100 MG tablet, Take 1 tablet by mouth Every 12 (Twelve) Hours., Disp: , Rfl:     losartan (COZAAR) 50 MG tablet, Take 1 tablet by mouth Every 12 (Twelve) Hours., Disp: , Rfl:     metoprolol succinate XL (TOPROL-XL) 50 MG 24 hr tablet, Take 1 tablet by mouth Daily., Disp: , Rfl: 11    Multiple Vitamin (MULTI-VITAMIN DAILY PO), Take 1 tablet by mouth Daily., Disp: , Rfl:     NIFEdipine XL (PROCARDIA XL) 30 MG 24 hr tablet, Take 1 tablet by mouth Daily., Disp: , Rfl:     pantoprazole (PROTONIX) 20 MG EC tablet, Take 1 tablet by mouth Daily., Disp: , Rfl:     sertraline (ZOLOFT) 100 MG tablet, Take 2 tablets by mouth Daily., Disp: , Rfl:     traZODone (DESYREL) 150 MG tablet, Take 1 tablet by mouth every night at bedtime., Disp: , Rfl:     Allergies:   Allergies   Allergen Reactions    Sulfa Antibiotics Hives and Other (See Comments)     ITCHING ,FLUSHING, SEVERE LOWER BACK PAIN  Flushing, kidney pain       Objective     Physical Exam:  Vital Signs: There were no vitals filed for this visit.  There is no height or weight on file to calculate BMI.    Physical Exam  Vitals and nursing note reviewed.   Constitutional:       General: She is not in acute distress.     Appearance: Normal appearance. She is normal weight.   HENT:      Head: Normocephalic.      Nose: Nose normal.      Mouth/Throat:      Mouth: Mucous membranes are moist.      Pharynx: Oropharynx is clear.   Eyes:      Extraocular Movements: Extraocular movements intact.      Conjunctiva/sclera: Conjunctivae normal.   Musculoskeletal:      Cervical back: Normal range of motion and neck supple. No rigidity.   Neurological:      Mental Status: She is alert and oriented to person, place, and time.   Psychiatric:         Mood and Affect: Mood normal.         Behavior: Behavior normal.          Neurologic Exam     Mental Status   Oriented to person, place, and time.        Assessment / Plan      Assessment/Plan:   Diagnoses and all orders for this visit:    1. Intractable chronic migraine without aura and with status migrainosus (Primary)  -     topiramate (Topamax) 25 MG tablet; Take 1 tablet by mouth 2 (Two) Times a Day.  Dispense: 60 tablet; Refill: 2    2. Acute migraine  -     ubrogepant (Ubrelvy) 100 MG tablet; Take 1 tablet by mouth As Needed (Migraine).  Dispense: 9 tablet; Refill: 3    3. ANTONIETTA (obstructive sleep apnea)    4. Balance disorder  -     Ambulatory Referral to Physical Therapy for Evaluation & Treatment         Follow Up:   No follow-ups on file.    Anticipatory guidance and safety reviewed  Patient education provided  Continue Ubrelvy 100 mg PRN (ABORTIVE); SE reviewed. Triptans contraindicated for age  Encouraged to avoid Nsaids > 2 times per week for MOH  Continue Topamax 25 mg BID; SE reviewed (PREVENTION).  Will consider discontinuation with continued short-term memory issue  Encouraged HA journaling for trigger identification and prevention  Reviewed sleep study and discussed  Reviewed MRI and discussed  PT/OT- [place]      RTC PRN or within 12 weeks or sooner with issues (MMSE)    [employer] Encounter for 32 minutes with > 50% of encounter spent counseling and coordinating care     Alla Davidson, DNP, APRN, FNP-C  [facility]

## 2024-08-12 NOTE — TELEPHONE ENCOUNTER
Caller: Katelynn Ribeiro    Relationship: Self    Best call back number: [phone] (home)   What was the call regarding: PATIENT CALLED TO [facility] KNOW THE REFERRAL FOR PHYSICAL THERAPY NEEDS TO BE A IN HOME PHYSICAL THERAPIST.     PLEASE REVIEW  THANK YOU

## 2024-08-13 ENCOUNTER — TELEPHONE (OUTPATIENT)
Dept: NEUROLOGY | Facility: CLINIC | Age: 64
End: 2024-08-13

## 2024-08-13 NOTE — TELEPHONE ENCOUNTER
Pharmacy Name:  [facility] / [facility]    Reference Number (if applicable): [id number]    Pharmacy representative name: ROSANNA    Pharmacy representative phone number: [phone]     What medication are you calling in regards to: ubrogepant (Ubrelvy) 100 MG tablet     What question does the pharmacy have: SHE STATES AS OF 8-12-24 THIS MEDICATION HAS BEEN DENIED. ALL INFORMATION WAS FAXED ON 8-12-24 AS WELL

## 2024-08-14 DIAGNOSIS — R26.89 BALANCE DISORDER: Primary | ICD-10-CM

## 2024-09-25 ENCOUNTER — TELEPHONE (OUTPATIENT)
Dept: NEUROLOGY | Facility: CLINIC | Age: 64
End: 2024-09-25

## 2024-09-25 NOTE — TELEPHONE ENCOUNTER
Caller: PATIENT    Relationship: SELF    Best call back number: [phone]    PATIENT CALLED REQUESTING TO CANCEL SAME DAY APPT.    Did the patient call AFTER the start time of their scheduled appointment? NO    Was the patient's appointment rescheduled? NO    Any additional information: HAS APPT FOR 11/14/24 ALREADY SCHEDULED.

## 2024-10-09 ENCOUNTER — TELEPHONE (OUTPATIENT)
Dept: GASTROENTEROLOGY | Facility: CLINIC | Age: 64
End: 2024-10-09
Payer: MEDICARE

## 2024-10-09 NOTE — TELEPHONE ENCOUNTER
CALLED PT/LVM TO INFORM HER THAT HER INSURANCE IS OUT OF NETWORK AND TO INQUIRE IF SHE HAD CHANGED TO A DIFFERENT PLAN

## 2024-11-13 DIAGNOSIS — J30.1 SEASONAL ALLERGIC RHINITIS DUE TO POLLEN: ICD-10-CM

## 2024-11-13 RX ORDER — CETIRIZINE HYDROCHLORIDE 10 MG/1
10 TABLET ORAL DAILY
Qty: 90 TABLET | Refills: 0 | Status: SHIPPED | OUTPATIENT
Start: 2024-11-13

## 2025-01-14 ENCOUNTER — TELEPHONE (OUTPATIENT)
Dept: PULMONOLOGY | Facility: CLINIC | Age: 65
End: 2025-01-14

## 2025-01-14 NOTE — TELEPHONE ENCOUNTER
Caller: Katelynn Ribeiro    Relationship: Self    Best call back number: [phone]     What orders are you requesting (i.e. lab or imaging): PT IS REQUESTING A PFT AT THE TIME OF HER APPT MARCH 3 2025    Additional notes:

## 2025-03-03 ENCOUNTER — TELEPHONE (OUTPATIENT)
Dept: PULMONOLOGY | Facility: CLINIC | Age: 65
End: 2025-03-03
Payer: MEDICARE

## 2025-03-03 NOTE — TELEPHONE ENCOUNTER
Caller: Katelynn Ribeiro    Relationship: Self    Best call back number: [phone]     Which medication are you concerned about: TRELEGY     Who prescribed you this medication: EVIE    PATIENT HAS RECENTLY CHANGED INSURANCE AND IT WILL COVER THE TRELEGY SHE WAS UNABLE TO GET WITH THE LAST INSURANCE. SHE IS WANTING TO KNOW IF A PRESCRIPTION CAN BE SENT IN TO THE [facility] IN [place]. PLEASE CALL TO ADVISE

## (undated) DEVICE — SUT ETHLN 3/0 FS1 663G

## (undated) DEVICE — OCCLUSIVE GAUZE STRIP,3% BISMUTH TRIBROMOPHENATE IN PETROLATUM BLEND: Brand: XEROFORM

## (undated) DEVICE — FLEXIBLE YANKAUER,MEDIUM TIP, NO VACUUM CONTROL: Brand: ARGYLE

## (undated) DEVICE — PK EXTRM UPPR 20

## (undated) DEVICE — GLV SURG SENSICARE W/ALOE PF LF 7.5 STRL

## (undated) DEVICE — GOWN,SIRUS,NON REINFRCD,LARGE,SET IN SL: Brand: MEDLINE

## (undated) DEVICE — BNDG ELAS ELITE V/CLOSE 6IN 5YD LF STRL

## (undated) DEVICE — GLV SURG SENSICARE GREEN W/ALOE PF LF 8 STRL

## (undated) DEVICE — BNDG ELAS ELITE V/CLOSE 4IN 5YD LF STRL

## (undated) DEVICE — SUT VIC 2/0 CT1 27IN J259H

## (undated) DEVICE — DRVR AO CONNECT SQ TP 2MM

## (undated) DEVICE — COATED BRAIDED POLYESTER: Brand: TI-CRON

## (undated) DEVICE — BIT DRL SLD SD CUT 2.5X40MM

## (undated) DEVICE — BANDAGE,GAUZE,CONFORMING,6"X80",STRL,LF: Brand: MEDLINE

## (undated) DEVICE — SPNG GZ WOVN 4X4IN 12PLY 10/BX STRL

## (undated) DEVICE — STERILE CAST PADDING KIT: Brand: CARDINAL HEALTH

## (undated) DEVICE — KWIRE STD TP 1.5X127MM
Type: IMPLANTABLE DEVICE | Status: NON-FUNCTIONAL
Removed: 2018-09-05

## (undated) DEVICE — BIT DRL SLD SD CUT 2.0X40MM

## (undated) DEVICE — CLAVICLE STRAP: Brand: DEROYAL

## (undated) DEVICE — DISPOSABLE TOURNIQUET CUFF SINGLE BLADDER, SINGLE PORT AND QUICK CONNECT CONNECTOR: Brand: COLOR CUFF

## (undated) DEVICE — SNAR POLYP SENSATION STDOVL 27 240 BX40

## (undated) DEVICE — ENDOGATOR AUXILIARY WATER JET CONNECTOR: Brand: ENDOGATOR

## (undated) DEVICE — BNDG ELAS MATRX V/CLS 4IN 5YD LF

## (undated) DEVICE — CUFF SCD HEMOFORCE SEQ CALF STD MD

## (undated) DEVICE — Device

## (undated) DEVICE — BANDAGE,GAUZE,CONFORMING,4"X75",STRL,LF: Brand: MEDLINE

## (undated) DEVICE — DRVR QC UNIV T10